# Patient Record
Sex: MALE | Race: WHITE | URBAN - METROPOLITAN AREA
[De-identification: names, ages, dates, MRNs, and addresses within clinical notes are randomized per-mention and may not be internally consistent; named-entity substitution may affect disease eponyms.]

---

## 2021-12-14 ENCOUNTER — OFFICE VISIT (OUTPATIENT)
Dept: FAMILY MEDICINE CLINIC | Facility: CLINIC | Age: 31
End: 2021-12-14

## 2021-12-14 VITALS
DIASTOLIC BLOOD PRESSURE: 90 MMHG | TEMPERATURE: 98 F | RESPIRATION RATE: 18 BRPM | WEIGHT: 315 LBS | HEIGHT: 66 IN | HEART RATE: 88 BPM | SYSTOLIC BLOOD PRESSURE: 138 MMHG | BODY MASS INDEX: 50.62 KG/M2 | OXYGEN SATURATION: 97 %

## 2021-12-14 DIAGNOSIS — Z00.00 WELLNESS EXAMINATION: Primary | ICD-10-CM

## 2021-12-14 DIAGNOSIS — I10 PRIMARY HYPERTENSION: ICD-10-CM

## 2021-12-14 DIAGNOSIS — E78.2 MIXED HYPERLIPIDEMIA: ICD-10-CM

## 2021-12-14 DIAGNOSIS — E11.65 TYPE 2 DIABETES MELLITUS WITH HYPERGLYCEMIA, WITHOUT LONG-TERM CURRENT USE OF INSULIN (HCC): ICD-10-CM

## 2021-12-14 PROBLEM — E66.01 MORBID OBESITY DUE TO EXCESS CALORIES (HCC): Status: ACTIVE | Noted: 2021-12-14

## 2021-12-14 PROCEDURE — 99385 PREV VISIT NEW AGE 18-39: CPT | Performed by: FAMILY MEDICINE

## 2021-12-14 PROCEDURE — 3080F DIAST BP >= 90 MM HG: CPT | Performed by: FAMILY MEDICINE

## 2021-12-14 PROCEDURE — 3075F SYST BP GE 130 - 139MM HG: CPT | Performed by: FAMILY MEDICINE

## 2021-12-14 PROCEDURE — 3008F BODY MASS INDEX DOCD: CPT | Performed by: FAMILY MEDICINE

## 2021-12-14 RX ORDER — LOSARTAN POTASSIUM 25 MG/1
25 TABLET ORAL DAILY
COMMUNITY
Start: 2021-10-14 | End: 2021-12-14

## 2021-12-14 RX ORDER — KETOCONAZOLE 20 MG/ML
SHAMPOO TOPICAL
COMMUNITY
Start: 2021-08-05

## 2021-12-14 RX ORDER — TAZAROTENE 1 MG/G
CREAM TOPICAL
COMMUNITY

## 2021-12-14 RX ORDER — CLINDAMYCIN PHOSPHATE AND BENZOYL PEROXIDE 10; 50 MG/G; MG/G
GEL TOPICAL
COMMUNITY

## 2021-12-14 RX ORDER — AMLODIPINE BESYLATE 10 MG/1
10 TABLET ORAL DAILY
COMMUNITY
Start: 2021-10-14 | End: 2021-12-14

## 2021-12-14 RX ORDER — LOSARTAN POTASSIUM 25 MG/1
25 TABLET ORAL DAILY
Qty: 90 TABLET | Refills: 1 | Status: SHIPPED | OUTPATIENT
Start: 2021-12-14

## 2021-12-14 RX ORDER — AMLODIPINE BESYLATE 10 MG/1
10 TABLET ORAL DAILY
Qty: 90 TABLET | Refills: 1 | Status: SHIPPED | OUTPATIENT
Start: 2021-12-14

## 2021-12-14 NOTE — PATIENT INSTRUCTIONS
Thank you for choosing Yogesh Rocha MD at [place] [address]  To Do: Jana Sloan  1. Please see info below  [facility] is located in [address]. Monday, Tuesday & Friday – 8 a.m. to 4 p.m. Wednesday, Thursday – 7 a.m. to 3 p.m.   The lab potential risks and we strive to make you healthier and to improve your quality of life.     Referrals, and Radiology Information:    If your insurance requires a referral to a specialist, please allow 5 business days to process your referral request.    If include fruits and fruit juices, dairy products, honey, and molasses. Added sugars are found in most desserts, processed foods, candy, regular soda, and fruit drinks. These are very helpful to treat low blood sugar (hypoglycemia).  They give you sugar quick cup of casserole  · 6 chicken nuggets  · 2-inch-square brownie or cake without frosting  · 2 small cookies  · 1/2 cup of ice cream or sherbet  Carb counting is easier when food labels are available.  Look at the label to see how many grams of total carbohyd figure out how many grams of carbs you should have. Justen last reviewed this educational content on 11/1/2019  © 7268-0102 [employer]. All rights reserved. This information is not intended as a substitute for professional medical care. at increased risk for infection – talk with your healthcare provider Check with your healthcare provider   Vision All men in this age group Every 5 to 10 years if no risk factors for eye disease   Vaccines Who needs it How often   Chickenpox (varicella) Al exercise Overweight or obese people When diagnosed, and then at routine exams   Use of tobacco and the health effects it can cause All men in this age group Every visit   Sexually transmitted infection prevention Men who are sexually active At routine exam

## 2021-12-14 NOTE — H&P
Wellness Exam    REASON FOR VISIT:    Gloria Chiu is a 33year old male who presents for an [address]     Current Complaints: Mr. Alma Rosa Betancur is here for his wellness exam  Flu Shot: see immunization record  Health Maintenance Topics with due Score: 0     PHQ-4: Over the LAST 2 WEEKS       Depression Screening (PHQ-2/PHQ-9): Over the LAST 2 WEEKS   Little interest or pleasure in doing things (over the last two weeks)?: Not at all    Feeling down, depressed, or hopeless (over the last two weeks) Tab Take 1 tablet (10 mg total) by mouth daily. 90 tablet 1   • losartan 25 MG Oral Tab Take 1 tablet (25 mg total) by mouth daily.  90 tablet 1      MEDICAL INFORMATION:   Past Medical History:   Diagnosis Date   • Diabetes ([facility]    • Essential hypertensio and normal bilaterally  Nose: Nose normal.   Eyes: EOM are normal. Pupils are equal, round, and reactive to light. No scleral icterus. Neck: Normal range of motion. No thyromegaly present.    Cardiovascular: Normal rate, regular rhythm and normal heart so mg total) by mouth daily. -     losartan 25 MG Oral Tab; Take 1 tablet (25 mg total) by mouth daily.       Mixed hyperlipidemia  -We will check lipid panel      Orders Placed This Encounter      CBC W Differential W Platelet [E]      Comp Metabolic Panel (

## 2021-12-28 ENCOUNTER — E-VISIT (OUTPATIENT)
Dept: TELEHEALTH | Age: 31
End: 2021-12-28

## 2021-12-28 DIAGNOSIS — Z02.9 ENCOUNTERS FOR ADMINISTRATIVE PURPOSE: Primary | ICD-10-CM

## 2021-12-28 DIAGNOSIS — Z87.898 HX OF ABDOMINAL PAIN: ICD-10-CM

## 2021-12-28 DIAGNOSIS — Z02.9 ENCOUNTER FOR ADMINISTRATIVE EXAMINATIONS, UNSPECIFIED: Primary | ICD-10-CM

## 2021-12-29 ENCOUNTER — TELEMEDICINE (OUTPATIENT)
Dept: FAMILY MEDICINE CLINIC | Facility: CLINIC | Age: 31
End: 2021-12-29

## 2021-12-29 DIAGNOSIS — Z20.822 ENCOUNTER FOR LABORATORY TESTING FOR COVID-19 VIRUS: Primary | ICD-10-CM

## 2021-12-29 PROCEDURE — 99213 OFFICE O/P EST LOW 20 MIN: CPT | Performed by: FAMILY MEDICINE

## 2021-12-29 NOTE — PROGRESS NOTES
Subjective    This visit is conducted using Telemedicine with live, interactive video and audio.     Chief Complaint:  Patient presents with:  Covid-19 Test        The patient confirmed knowledge of the limitations of the use of telemedicine were verbally tobacco: Never Used    Vaping Use      Vaping Use: Never used    Alcohol use: Yes      Comment: 2 a week    Drug use: Never           COVID-19 QUESTIONS - quick screening.    Contact with COVID-19 positive person: no  Recent Travel no  Pt is a [employer]

## 2021-12-29 NOTE — PROGRESS NOTES
No charge, pt following up from E-visit yesterday. Pt referred for in person evaluation. Gave patient locations to follow up for an inperson evaluation.

## 2021-12-30 ENCOUNTER — NURSE ONLY (OUTPATIENT)
Dept: LAB | Age: 31
End: 2021-12-30
Attending: FAMILY MEDICINE
Payer: COMMERCIAL

## 2021-12-30 DIAGNOSIS — Z20.822 ENCOUNTER FOR LABORATORY TESTING FOR COVID-19 VIRUS: ICD-10-CM

## 2022-01-02 LAB — SARS-COV-2 RNA RESP QL NAA+PROBE: NOT DETECTED

## 2022-01-04 ENCOUNTER — VIRTUAL PHONE E/M (OUTPATIENT)
Dept: FAMILY MEDICINE CLINIC | Facility: CLINIC | Age: 32
End: 2022-01-04
Payer: COMMERCIAL

## 2022-01-04 DIAGNOSIS — J02.9 SORE THROAT: Primary | ICD-10-CM

## 2022-01-04 PROCEDURE — 99214 OFFICE O/P EST MOD 30 MIN: CPT | Performed by: FAMILY MEDICINE

## 2022-01-04 NOTE — PROGRESS NOTES
Subjective:   Patient ID: Ld Broussard is a 33year old male. HPI  Mr. Mio Carrasquillo is a pleasant 27-year-old gentleman with history of hypertension who is presenting for phone visit today for sore throat. He woke up with this today.   Does not have fever, correct errors during dictation discrepancies may still exist          Orders Placed This Encounter      COVID-19 Testing by PCR ([facility]) [E]      Meds This Visit:  Requested Prescriptions      No prescriptions requested or ordered in this encounter

## 2022-01-04 NOTE — PROGRESS NOTES
Virtual Telephone Check-In    Rodolfo Wiggins verbally consents to a Virtual/Telephone Check-In visit on 01/04/22. Patient has been referred to the [facility] website at [email or web address] to review the yearly Consent to Treat document.     Patient Kathleen

## 2022-01-04 NOTE — PATIENT INSTRUCTIONS
Coronavirus Disease 2019 (COVID-19)     [place] [address] is committed to the safety and well-being of our patients, members, employees, and communities.  As concerns arise about the new strain of coronavirus that causes COVID-19, [place] [address] exposure  • After day 7 from date of last exposure with a negative test result (test must occur on day 5 or later)  After stopping quarantine, you should  • Watch for symptoms until 14 days after exposure.   • If you have symptoms, immediately self-isolate Care     If you are awaiting test results or are confirmed positive for COVID -19, and your symptoms worsen at home with symptoms such as: extreme weakness, difficult breathing, or unrelenting fevers greater than 100.4 degrees Fahrenheit, you should contac Follow-up  If you are diagnosed with COVID, refrain from exercise until approved by your primary care provider. Please call your primary care provider within 2 days of your discharge to arrange for a telehealth follow-up.  CDC does not recommend repeat test Control & Prevention (CDC)  10 things you can do to manage your health at home, Cj.nl. pdf  [email or web address]cy Retrieved March 17, 2021, from [email or web address]/covid-19-information/covid-19-long-haulers. html  Long-term effects of covid-19. (n.d.).  Retrieved May 11, 2021, from [email or web address]

## 2022-01-05 ENCOUNTER — NURSE ONLY (OUTPATIENT)
Dept: LAB | Facility: HOSPITAL | Age: 32
End: 2022-01-05
Attending: FAMILY MEDICINE
Payer: COMMERCIAL

## 2022-01-05 DIAGNOSIS — J02.9 SORE THROAT: ICD-10-CM

## 2022-01-08 LAB — SARS-COV-2 RNA RESP QL NAA+PROBE: DETECTED

## 2022-03-14 ENCOUNTER — LAB ENCOUNTER (OUTPATIENT)
Dept: LAB | Age: 32
End: 2022-03-14
Attending: FAMILY MEDICINE
Payer: COMMERCIAL

## 2022-03-14 DIAGNOSIS — E11.65 TYPE 2 DIABETES MELLITUS WITH HYPERGLYCEMIA, WITHOUT LONG-TERM CURRENT USE OF INSULIN (HCC): ICD-10-CM

## 2022-03-14 LAB
ALBUMIN SERPL-MCNC: 3.9 G/DL (ref 3.4–5)
ALBUMIN/GLOB SERPL: 1.1 {RATIO} (ref 1–2)
ALP LIVER SERPL-CCNC: 68 U/L
ALT SERPL-CCNC: 36 U/L
ANION GAP SERPL CALC-SCNC: 1 MMOL/L (ref 0–18)
AST SERPL-CCNC: 18 U/L (ref 15–37)
BASOPHILS # BLD AUTO: 0.06 X10(3) UL (ref 0–0.2)
BASOPHILS NFR BLD AUTO: 0.8 %
BILIRUB SERPL-MCNC: 0.3 MG/DL (ref 0.1–2)
BUN BLD-MCNC: 9 MG/DL (ref 7–18)
CALCIUM BLD-MCNC: 9.1 MG/DL (ref 8.5–10.1)
CHLORIDE SERPL-SCNC: 104 MMOL/L (ref 98–112)
CHOLEST SERPL-MCNC: 165 MG/DL (ref ?–200)
CO2 SERPL-SCNC: 32 MMOL/L (ref 21–32)
CREAT BLD-MCNC: 0.65 MG/DL
CREAT UR-SCNC: 128 MG/DL
EOSINOPHIL # BLD AUTO: 0.13 X10(3) UL (ref 0–0.7)
EOSINOPHIL NFR BLD AUTO: 1.6 %
ERYTHROCYTE [DISTWIDTH] IN BLOOD BY AUTOMATED COUNT: 13.7 %
EST. AVERAGE GLUCOSE BLD GHB EST-MCNC: 140 MG/DL (ref 68–126)
FASTING PATIENT LIPID ANSWER: YES
FASTING STATUS PATIENT QL REPORTED: YES
GLOBULIN PLAS-MCNC: 3.5 G/DL (ref 2.8–4.4)
GLUCOSE BLD-MCNC: 133 MG/DL (ref 70–99)
HBA1C MFR BLD: 6.5 % (ref ?–5.7)
HCT VFR BLD AUTO: 44.9 %
HDLC SERPL-MCNC: 38 MG/DL (ref 40–59)
HGB BLD-MCNC: 15 G/DL
IMM GRANULOCYTES # BLD AUTO: 0.05 X10(3) UL (ref 0–1)
IMM GRANULOCYTES NFR BLD: 0.6 %
LDLC SERPL CALC-MCNC: 93 MG/DL (ref ?–100)
LYMPHOCYTES # BLD AUTO: 2.98 X10(3) UL (ref 1–4)
LYMPHOCYTES NFR BLD AUTO: 37.6 %
MCH RBC QN AUTO: 29.4 PG (ref 26–34)
MCHC RBC AUTO-ENTMCNC: 33.4 G/DL (ref 31–37)
MCV RBC AUTO: 88 FL
MICROALBUMIN UR-MCNC: 18.8 MG/DL
MICROALBUMIN/CREAT 24H UR-RTO: 146.9 UG/MG (ref ?–30)
MONOCYTES # BLD AUTO: 0.59 X10(3) UL (ref 0.1–1)
MONOCYTES NFR BLD AUTO: 7.4 %
NEUTROPHILS # BLD AUTO: 4.12 X10 (3) UL (ref 1.5–7.7)
NEUTROPHILS # BLD AUTO: 4.12 X10(3) UL (ref 1.5–7.7)
NEUTROPHILS NFR BLD AUTO: 52 %
NONHDLC SERPL-MCNC: 127 MG/DL (ref ?–130)
OSMOLALITY SERPL CALC.SUM OF ELEC: 285 MOSM/KG (ref 275–295)
PLATELET # BLD AUTO: 324 10(3)UL (ref 150–450)
POTASSIUM SERPL-SCNC: 4.1 MMOL/L (ref 3.5–5.1)
PROT SERPL-MCNC: 7.4 G/DL (ref 6.4–8.2)
RBC # BLD AUTO: 5.1 X10(6)UL
SODIUM SERPL-SCNC: 137 MMOL/L (ref 136–145)
T4 FREE SERPL-MCNC: 1 NG/DL (ref 0.8–1.7)
TRIGL SERPL-MCNC: 196 MG/DL (ref 30–149)
TSI SER-ACNC: 2.26 MIU/ML (ref 0.36–3.74)
VLDLC SERPL CALC-MCNC: 32 MG/DL (ref 0–30)
WBC # BLD AUTO: 7.9 X10(3) UL (ref 4–11)

## 2022-03-14 PROCEDURE — 84443 ASSAY THYROID STIM HORMONE: CPT

## 2022-03-14 PROCEDURE — 3044F HG A1C LEVEL LT 7.0%: CPT | Performed by: FAMILY MEDICINE

## 2022-03-14 PROCEDURE — 83036 HEMOGLOBIN GLYCOSYLATED A1C: CPT

## 2022-03-14 PROCEDURE — 82043 UR ALBUMIN QUANTITATIVE: CPT

## 2022-03-14 PROCEDURE — 36415 COLL VENOUS BLD VENIPUNCTURE: CPT

## 2022-03-14 PROCEDURE — 82570 ASSAY OF URINE CREATININE: CPT

## 2022-03-14 PROCEDURE — 80053 COMPREHEN METABOLIC PANEL: CPT

## 2022-03-14 PROCEDURE — 80061 LIPID PANEL: CPT

## 2022-03-14 PROCEDURE — 85025 COMPLETE CBC W/AUTO DIFF WBC: CPT

## 2022-03-14 PROCEDURE — 3060F POS MICROALBUMINURIA REV: CPT | Performed by: FAMILY MEDICINE

## 2022-03-14 PROCEDURE — 84439 ASSAY OF FREE THYROXINE: CPT

## 2022-03-14 PROCEDURE — 3061F NEG MICROALBUMINURIA REV: CPT | Performed by: FAMILY MEDICINE

## 2022-03-17 ENCOUNTER — OFFICE VISIT (OUTPATIENT)
Dept: FAMILY MEDICINE CLINIC | Facility: CLINIC | Age: 32
End: 2022-03-17
Payer: COMMERCIAL

## 2022-03-17 VITALS
HEART RATE: 92 BPM | DIASTOLIC BLOOD PRESSURE: 86 MMHG | WEIGHT: 315 LBS | RESPIRATION RATE: 16 BRPM | SYSTOLIC BLOOD PRESSURE: 130 MMHG | TEMPERATURE: 98 F | HEIGHT: 66 IN | BODY MASS INDEX: 50.62 KG/M2 | OXYGEN SATURATION: 98 %

## 2022-03-17 DIAGNOSIS — I10 PRIMARY HYPERTENSION: Primary | ICD-10-CM

## 2022-03-17 DIAGNOSIS — E11.65 TYPE 2 DIABETES MELLITUS WITH HYPERGLYCEMIA, WITHOUT LONG-TERM CURRENT USE OF INSULIN (HCC): ICD-10-CM

## 2022-03-17 PROCEDURE — 3008F BODY MASS INDEX DOCD: CPT | Performed by: FAMILY MEDICINE

## 2022-03-17 PROCEDURE — 3079F DIAST BP 80-89 MM HG: CPT | Performed by: FAMILY MEDICINE

## 2022-03-17 PROCEDURE — 3075F SYST BP GE 130 - 139MM HG: CPT | Performed by: FAMILY MEDICINE

## 2022-03-17 PROCEDURE — 99214 OFFICE O/P EST MOD 30 MIN: CPT | Performed by: FAMILY MEDICINE

## 2022-03-17 RX ORDER — LOSARTAN POTASSIUM 25 MG/1
25 TABLET ORAL DAILY
Qty: 90 TABLET | Refills: 1 | Status: SHIPPED | OUTPATIENT
Start: 2022-03-17

## 2022-03-17 RX ORDER — AMLODIPINE BESYLATE 10 MG/1
10 TABLET ORAL DAILY
Qty: 90 TABLET | Refills: 1 | Status: SHIPPED | OUTPATIENT
Start: 2022-03-17

## 2022-03-28 ENCOUNTER — DIABETIC EDUCATION (OUTPATIENT)
Dept: ENDOCRINOLOGY CLINIC | Facility: CLINIC | Age: 32
End: 2022-03-28
Payer: COMMERCIAL

## 2022-03-28 DIAGNOSIS — E11.9 TYPE 2 DIABETES MELLITUS WITHOUT COMPLICATION, WITHOUT LONG-TERM CURRENT USE OF INSULIN (HCC): Primary | ICD-10-CM

## 2022-03-28 PROCEDURE — 97802 MEDICAL NUTRITION INDIV IN: CPT | Performed by: DIETITIAN, REGISTERED

## 2022-03-28 NOTE — PROGRESS NOTES
Rodolfo Wiggins   10/23/1990 was seen for Medical Nutrition Therapy with Diabetes:    3/28/2022  Referring Provider: Dr. Scruggs Marking  Start time: 3:00 End time: 4:00    HgbA1C (%)   Date Value   03/14/2022 6.5 (H)     Newly diagnosed with T2DB. During the day he is good but challenge is late at noc. Tries to keep to ~400-500 calories/meal for B&L.   B: 1/2 c oatmeal, almond protein powder, flaxseed, blueberries. L: wrap    Tracks calories on Lose It valentino. Doesn't track his HS snacks, gave alternatives. D: cooks  HS: snacks to stay awake while he works late into the evening. Bags of healthy chips. Beverages: water. Cut down on diet coke. Likes coffee - 5 calories/TBS creamer    Regular physical activity: try to get up earlier and work out. New puppy in January, he likes to take 30 minutes walks with the puppy. Reviewed HgbA1C and target A1c levels. Reviewed Type 2 diabetes as a diagnosis. Discussed insulin resistance and tools to treat: diet, regular physical activity, diabetes medications, and stress management. Also discussed natural progression of Type 2 diabetes and ways to slow progression: regular physical activity, good blood glucose control and avoid weight gain/possibly reduce weight. Taught label reading: focus on counting grams of carb rather than looking at sugar. Reviewed current diabetes medications: n/a    Discussed Rule of 15 for treating hypoglycemia. Discussed macronutrient balance: reduce starch, include lean protein and non-starchy vegetables, also fruit, yogurt and milk using food models. Gave examples of heart healthy, balanced meals with 30-45 grams of carbohydrate/meal.     Reviewed principles for heart healthy diet: reduced saturated fat, reduced sodium and high fiber. Exercise: Discussed benefits of regular physical activity and goal to complete 30 minutes daily.     Patient set diabetes self-management goals: 1) change or eliminate HS snacking/go to bed earlier, 2) consistent physical activity, 3) track calories consistently to promote weight reduction. Patient is willing to make lifestyle changes to improve blood glucose control. Written materials provided for all topics covered. Patient verbalized understanding and has no further questions at this time. Thank you for the referral.  Patient to contact diabetes center for questions/concerns.   Willian Mane MS, RD, CDCES, LDN

## 2022-09-13 RX ORDER — AMLODIPINE BESYLATE 10 MG/1
TABLET ORAL
Qty: 90 TABLET | Refills: 1 | Status: SHIPPED | OUTPATIENT
Start: 2022-09-13

## 2022-09-13 RX ORDER — LOSARTAN POTASSIUM 25 MG/1
TABLET ORAL
Qty: 90 TABLET | Refills: 1 | Status: SHIPPED | OUTPATIENT
Start: 2022-09-13

## 2022-11-08 ENCOUNTER — HOSPITAL ENCOUNTER (OUTPATIENT)
Age: 32
Discharge: HOME | End: 2022-11-08
Payer: COMMERCIAL

## 2022-11-08 PROCEDURE — 90471 IMMUNIZATION ADMIN: CPT

## 2022-11-15 ENCOUNTER — LAB ENCOUNTER (OUTPATIENT)
Dept: LAB | Age: 32
End: 2022-11-15
Attending: FAMILY MEDICINE
Payer: COMMERCIAL

## 2022-11-15 DIAGNOSIS — E11.65 TYPE 2 DIABETES MELLITUS WITH HYPERGLYCEMIA, WITHOUT LONG-TERM CURRENT USE OF INSULIN (HCC): ICD-10-CM

## 2022-11-15 LAB
EST. AVERAGE GLUCOSE BLD GHB EST-MCNC: 148 MG/DL (ref 68–126)
HBA1C MFR BLD: 6.8 % (ref ?–5.7)

## 2022-11-15 PROCEDURE — 3044F HG A1C LEVEL LT 7.0%: CPT | Performed by: FAMILY MEDICINE

## 2022-11-15 PROCEDURE — 83036 HEMOGLOBIN GLYCOSYLATED A1C: CPT

## 2022-11-15 PROCEDURE — 36415 COLL VENOUS BLD VENIPUNCTURE: CPT

## 2022-11-17 ENCOUNTER — OFFICE VISIT (OUTPATIENT)
Dept: FAMILY MEDICINE CLINIC | Facility: CLINIC | Age: 32
End: 2022-11-17
Payer: COMMERCIAL

## 2022-11-17 VITALS
HEIGHT: 66 IN | BODY MASS INDEX: 50.62 KG/M2 | OXYGEN SATURATION: 99 % | WEIGHT: 315 LBS | SYSTOLIC BLOOD PRESSURE: 150 MMHG | RESPIRATION RATE: 16 BRPM | TEMPERATURE: 98 F | HEART RATE: 88 BPM | DIASTOLIC BLOOD PRESSURE: 94 MMHG

## 2022-11-17 DIAGNOSIS — I10 PRIMARY HYPERTENSION: ICD-10-CM

## 2022-11-17 DIAGNOSIS — R41.840 LACK OF CONCENTRATION: ICD-10-CM

## 2022-11-17 DIAGNOSIS — R06.81 APNEA: ICD-10-CM

## 2022-11-17 DIAGNOSIS — E11.65 TYPE 2 DIABETES MELLITUS WITH HYPERGLYCEMIA, WITHOUT LONG-TERM CURRENT USE OF INSULIN (HCC): Primary | ICD-10-CM

## 2022-11-17 PROCEDURE — 99215 OFFICE O/P EST HI 40 MIN: CPT | Performed by: FAMILY MEDICINE

## 2022-11-17 PROCEDURE — 3008F BODY MASS INDEX DOCD: CPT | Performed by: FAMILY MEDICINE

## 2022-11-17 PROCEDURE — 3077F SYST BP >= 140 MM HG: CPT | Performed by: FAMILY MEDICINE

## 2022-11-17 PROCEDURE — 3080F DIAST BP >= 90 MM HG: CPT | Performed by: FAMILY MEDICINE

## 2022-11-17 NOTE — PATIENT INSTRUCTIONS
Thank you for choosing Olman Quiñones MD at [place] [address]  To Do: Tanna Carrasquillo  1. Please see below  [facility] is located in [address]. Monday, Tuesday & Friday - 8 a.m. to 4 p.m. Wednesday, Thursday - 7 a.m. to 3 p.m. The lab is closed daily from 12 p.m.-12:30 p.m. Saturday lab hours by appointment. Call [phone] to schedule the appointment. Please signup for Bolt, which is electronic access to your record if you have not done so. All your results will post on there. [email or web address]   You can NOW use Bolt to book your appointments with us, or consider using open access scheduling which is through the edward website [email or web address]. [employer]ne[employer] and type in Olman Quiñones MD and follow the links for \"Schedule Online Now\"    To schedule Imaging or tests at [facility] Scheduling [phone], Go to [facility] A ER Building (For example: CT scans, X rays, Ultrasound, MRI)  Cardiac Testing in ER building Building A second floor Cardiac Testing [phone] (For example: Holter Monitor, Cardiac Stress tests,Event Monitor, or 2D Echocardiograms)  [facility] call [phone] usually in Bldg A  Walk in Clinic in [place] at [facility]. [address] Mon-Fri at 8am-7:30 p.m., and Sat/Sun 9:00a. m.-4:30 p.m. Also at [address] [facility]  Call [phone] for info     Please call our office about any questions regarding your treatment/medicines/tests as a result of today's visit. For your safety, read the entire package insert of all medicines prescribed to you and be aware of all of the risks of treatment even beyond those discussed today. All therapies have potential risk of harm or side effects or medication interactions.   It is your duty and for your safety to discuss with the pharmacist and our office with questions, and to notify us and stop treatment if problems arise, but know that our intention is that the benefits outweigh those potential risks and we strive to make you healthier and to improve your quality of life. Referrals, and Radiology Information:    If your insurance requires a referral to a specialist, please allow 5 business days to process your referral request.    If Chio Carlin MD orders a CT or MRI, it may take up to 10 business days to receive approval from your insurance company. Once our office has called informing you that the insurance company approved your testing, please call Central Scheduling at [phone]  Please allow our office 5 business days to contact you regarding any testing results. Refill policies:   Allow 3 business days for refills; controlled substances may take longer and must be picked up from the office in person. Narcotic medications can only be filled in 30 day increments and must be refilled at an office visit only. If your prescription is due for a refill, you may be due for a follow-up appointment. We cannot refill your maintenance medications at a preventative wellness visit. To best provide you care, patients receiving maintenance medications need to be seen at least twice a year.

## 2022-12-04 ENCOUNTER — PATIENT MESSAGE (OUTPATIENT)
Dept: FAMILY MEDICINE CLINIC | Facility: CLINIC | Age: 32
End: 2022-12-04

## 2022-12-05 ENCOUNTER — PATIENT MESSAGE (OUTPATIENT)
Dept: FAMILY MEDICINE CLINIC | Facility: CLINIC | Age: 32
End: 2022-12-05

## 2022-12-05 DIAGNOSIS — Z00.8 PSYCHOLOGICAL ASSESSMENT: ICD-10-CM

## 2022-12-05 DIAGNOSIS — R41.840 LACK OF CONCENTRATION: Primary | ICD-10-CM

## 2022-12-06 NOTE — TELEPHONE ENCOUNTER
From: Stiven Edmondson  Sent: 12/5/2022 2:28 PM CST  To: [facility] Clinical Staff  Subject: ADHD     I called them and they indicated they do work with my insurance. If you could send a referral to them that would be great. Their info is below:    [facility]  Fax: [phone]  Email: [email or web address]

## 2022-12-07 ENCOUNTER — PATIENT MESSAGE (OUTPATIENT)
Dept: FAMILY MEDICINE CLINIC | Facility: CLINIC | Age: 32
End: 2022-12-07

## 2022-12-08 NOTE — TELEPHONE ENCOUNTER
Updated referral placed and faxed to Dr. Sherwin Alberto with [facility], [phone], confirmation received.

## 2022-12-09 ENCOUNTER — IMMUNIZATION (OUTPATIENT)
Dept: LAB | Age: 32
End: 2022-12-09
Attending: EMERGENCY MEDICINE
Payer: COMMERCIAL

## 2022-12-09 DIAGNOSIS — Z23 NEED FOR VACCINATION: Primary | ICD-10-CM

## 2022-12-09 PROCEDURE — 0134A SARSCOV2 VAC BVL 50MCG/0.5ML: CPT

## 2022-12-29 ENCOUNTER — OFFICE VISIT (OUTPATIENT)
Dept: SLEEP CENTER | Age: 32
End: 2022-12-29
Attending: FAMILY MEDICINE
Payer: COMMERCIAL

## 2022-12-29 DIAGNOSIS — R06.81 APNEA: ICD-10-CM

## 2022-12-29 PROCEDURE — 95810 POLYSOM 6/> YRS 4/> PARAM: CPT

## 2023-01-05 ENCOUNTER — SLEEP STUDY (OUTPATIENT)
Facility: CLINIC | Age: 33
End: 2023-01-05
Payer: COMMERCIAL

## 2023-01-05 DIAGNOSIS — G47.33 OBSTRUCTIVE SLEEP APNEA: Primary | ICD-10-CM

## 2023-01-05 PROCEDURE — 95810 POLYSOM 6/> YRS 4/> PARAM: CPT | Performed by: OTHER

## 2023-01-09 ENCOUNTER — TELEPHONE (OUTPATIENT)
Facility: CLINIC | Age: 33
End: 2023-01-09

## 2023-01-09 DIAGNOSIS — G47.33 OSA (OBSTRUCTIVE SLEEP APNEA): Primary | ICD-10-CM

## 2023-01-09 NOTE — TELEPHONE ENCOUNTER
Notified pt of sleep study results and  Recommendation to proceed with cpap titration. Pt instructed to follow up with  After testing is done. Pt verbalized understanding of instructions and agreed with the plan.

## 2023-02-07 ENCOUNTER — LAB ENCOUNTER (OUTPATIENT)
Dept: LAB | Age: 33
End: 2023-02-07
Attending: Other
Payer: COMMERCIAL

## 2023-02-07 DIAGNOSIS — Z01.818 PREOPERATIVE EXAMINATION, UNSPECIFIED: ICD-10-CM

## 2023-02-07 DIAGNOSIS — Z11.59 SCREENING FOR VIRAL DISEASE: ICD-10-CM

## 2023-02-08 LAB — SARS-COV-2 RNA RESP QL NAA+PROBE: NOT DETECTED

## 2023-02-10 ENCOUNTER — OFFICE VISIT (OUTPATIENT)
Dept: SLEEP CENTER | Age: 33
End: 2023-02-10
Attending: Other
Payer: COMMERCIAL

## 2023-02-10 DIAGNOSIS — G47.33 OSA (OBSTRUCTIVE SLEEP APNEA): ICD-10-CM

## 2023-02-10 PROCEDURE — 95811 POLYSOM 6/>YRS CPAP 4/> PARM: CPT

## 2023-02-16 ENCOUNTER — SLEEP STUDY (OUTPATIENT)
Facility: CLINIC | Age: 33
End: 2023-02-16
Payer: COMMERCIAL

## 2023-02-16 DIAGNOSIS — G47.33 OBSTRUCTIVE SLEEP APNEA: Primary | ICD-10-CM

## 2023-02-16 PROCEDURE — 95811 POLYSOM 6/>YRS CPAP 4/> PARM: CPT | Performed by: OTHER

## 2023-02-18 ENCOUNTER — LAB ENCOUNTER (OUTPATIENT)
Dept: LAB | Age: 33
End: 2023-02-18
Attending: FAMILY MEDICINE
Payer: COMMERCIAL

## 2023-02-18 DIAGNOSIS — E11.65 TYPE 2 DIABETES MELLITUS WITH HYPERGLYCEMIA, WITHOUT LONG-TERM CURRENT USE OF INSULIN (HCC): ICD-10-CM

## 2023-02-18 LAB
ALBUMIN SERPL-MCNC: 4.1 G/DL (ref 3.4–5)
ALBUMIN/GLOB SERPL: 1.2 {RATIO} (ref 1–2)
ALP LIVER SERPL-CCNC: 58 U/L
ALT SERPL-CCNC: 36 U/L
ANION GAP SERPL CALC-SCNC: 2 MMOL/L (ref 0–18)
AST SERPL-CCNC: 21 U/L (ref 15–37)
BASOPHILS # BLD AUTO: 0.03 X10(3) UL (ref 0–0.2)
BASOPHILS NFR BLD AUTO: 0.4 %
BILIRUB SERPL-MCNC: 0.5 MG/DL (ref 0.1–2)
BUN BLD-MCNC: 12 MG/DL (ref 7–18)
CALCIUM BLD-MCNC: 8.9 MG/DL (ref 8.5–10.1)
CHLORIDE SERPL-SCNC: 106 MMOL/L (ref 98–112)
CHOLEST SERPL-MCNC: 175 MG/DL (ref ?–200)
CO2 SERPL-SCNC: 31 MMOL/L (ref 21–32)
CREAT BLD-MCNC: 0.69 MG/DL
EOSINOPHIL # BLD AUTO: 0.07 X10(3) UL (ref 0–0.7)
EOSINOPHIL NFR BLD AUTO: 1 %
ERYTHROCYTE [DISTWIDTH] IN BLOOD BY AUTOMATED COUNT: 13.2 %
EST. AVERAGE GLUCOSE BLD GHB EST-MCNC: 143 MG/DL (ref 68–126)
FASTING PATIENT LIPID ANSWER: YES
FASTING STATUS PATIENT QL REPORTED: YES
GFR SERPLBLD BASED ON 1.73 SQ M-ARVRAT: 125 ML/MIN/1.73M2 (ref 60–?)
GLOBULIN PLAS-MCNC: 3.5 G/DL (ref 2.8–4.4)
GLUCOSE BLD-MCNC: 107 MG/DL (ref 70–99)
HBA1C MFR BLD: 6.6 % (ref ?–5.7)
HCT VFR BLD AUTO: 46.2 %
HDLC SERPL-MCNC: 37 MG/DL (ref 40–59)
HGB BLD-MCNC: 15.4 G/DL
IMM GRANULOCYTES # BLD AUTO: 0.03 X10(3) UL (ref 0–1)
IMM GRANULOCYTES NFR BLD: 0.4 %
LDLC SERPL CALC-MCNC: 113 MG/DL (ref ?–100)
LYMPHOCYTES # BLD AUTO: 2.32 X10(3) UL (ref 1–4)
LYMPHOCYTES NFR BLD AUTO: 32.9 %
MCH RBC QN AUTO: 29.1 PG (ref 26–34)
MCHC RBC AUTO-ENTMCNC: 33.3 G/DL (ref 31–37)
MCV RBC AUTO: 87.3 FL
MONOCYTES # BLD AUTO: 0.49 X10(3) UL (ref 0.1–1)
MONOCYTES NFR BLD AUTO: 6.9 %
NEUTROPHILS # BLD AUTO: 4.12 X10 (3) UL (ref 1.5–7.7)
NEUTROPHILS # BLD AUTO: 4.12 X10(3) UL (ref 1.5–7.7)
NEUTROPHILS NFR BLD AUTO: 58.4 %
NONHDLC SERPL-MCNC: 138 MG/DL (ref ?–130)
OSMOLALITY SERPL CALC.SUM OF ELEC: 288 MOSM/KG (ref 275–295)
PLATELET # BLD AUTO: 332 10(3)UL (ref 150–450)
POTASSIUM SERPL-SCNC: 3.9 MMOL/L (ref 3.5–5.1)
PROT SERPL-MCNC: 7.6 G/DL (ref 6.4–8.2)
RBC # BLD AUTO: 5.29 X10(6)UL
SODIUM SERPL-SCNC: 139 MMOL/L (ref 136–145)
T4 FREE SERPL-MCNC: 0.9 NG/DL (ref 0.8–1.7)
TRIGL SERPL-MCNC: 140 MG/DL (ref 30–149)
TSI SER-ACNC: 1.42 MIU/ML (ref 0.36–3.74)
VLDLC SERPL CALC-MCNC: 24 MG/DL (ref 0–30)
WBC # BLD AUTO: 7.1 X10(3) UL (ref 4–11)

## 2023-02-18 PROCEDURE — 85025 COMPLETE CBC W/AUTO DIFF WBC: CPT

## 2023-02-18 PROCEDURE — 83036 HEMOGLOBIN GLYCOSYLATED A1C: CPT

## 2023-02-18 PROCEDURE — 80053 COMPREHEN METABOLIC PANEL: CPT

## 2023-02-18 PROCEDURE — 36415 COLL VENOUS BLD VENIPUNCTURE: CPT

## 2023-02-18 PROCEDURE — 84443 ASSAY THYROID STIM HORMONE: CPT

## 2023-02-18 PROCEDURE — 3044F HG A1C LEVEL LT 7.0%: CPT | Performed by: FAMILY MEDICINE

## 2023-02-18 PROCEDURE — 80061 LIPID PANEL: CPT

## 2023-02-18 PROCEDURE — 84439 ASSAY OF FREE THYROXINE: CPT

## 2023-02-21 ENCOUNTER — TELEPHONE (OUTPATIENT)
Facility: CLINIC | Age: 33
End: 2023-02-21

## 2023-02-21 DIAGNOSIS — G47.33 OSA (OBSTRUCTIVE SLEEP APNEA): Primary | ICD-10-CM

## 2023-02-21 NOTE — TELEPHONE ENCOUNTER
Reviewed sleep study results and Dr's recommendations. Pt notified cpap machine ordered to [address] [place] [address]. DME will verify insurance and once approved will contact pt to arrange delivery and instructions. Pt instructed to follow up with Dr. Serafin Perez once pt starts PAP therapy per insurance compliance requirement. Pt verbalized understanding of instructions and agrees with the plan.

## 2023-02-22 ENCOUNTER — PATIENT MESSAGE (OUTPATIENT)
Dept: FAMILY MEDICINE CLINIC | Facility: CLINIC | Age: 33
End: 2023-02-22

## 2023-02-22 ENCOUNTER — OFFICE VISIT (OUTPATIENT)
Dept: FAMILY MEDICINE CLINIC | Facility: CLINIC | Age: 33
End: 2023-02-22
Payer: COMMERCIAL

## 2023-02-22 VITALS
WEIGHT: 315 LBS | HEART RATE: 88 BPM | RESPIRATION RATE: 16 BRPM | HEIGHT: 66 IN | DIASTOLIC BLOOD PRESSURE: 90 MMHG | SYSTOLIC BLOOD PRESSURE: 144 MMHG | TEMPERATURE: 98 F | OXYGEN SATURATION: 98 % | BODY MASS INDEX: 50.62 KG/M2

## 2023-02-22 DIAGNOSIS — E11.65 TYPE 2 DIABETES MELLITUS WITH HYPERGLYCEMIA, WITHOUT LONG-TERM CURRENT USE OF INSULIN (HCC): Primary | ICD-10-CM

## 2023-02-22 DIAGNOSIS — I10 PRIMARY HYPERTENSION: ICD-10-CM

## 2023-02-22 PROBLEM — G47.33 OSA (OBSTRUCTIVE SLEEP APNEA): Status: ACTIVE | Noted: 2023-02-22

## 2023-02-22 PROCEDURE — 3080F DIAST BP >= 90 MM HG: CPT | Performed by: FAMILY MEDICINE

## 2023-02-22 PROCEDURE — 3077F SYST BP >= 140 MM HG: CPT | Performed by: FAMILY MEDICINE

## 2023-02-22 PROCEDURE — 99214 OFFICE O/P EST MOD 30 MIN: CPT | Performed by: FAMILY MEDICINE

## 2023-02-22 PROCEDURE — 3008F BODY MASS INDEX DOCD: CPT | Performed by: FAMILY MEDICINE

## 2023-02-22 RX ORDER — LOSARTAN POTASSIUM 100 MG/1
100 TABLET ORAL DAILY
Qty: 90 TABLET | Refills: 1 | Status: SHIPPED | OUTPATIENT
Start: 2023-02-22

## 2023-02-22 NOTE — PATIENT INSTRUCTIONS
Thank you for choosing Panda Becerra MD at [place] [address]  To Do: Jania Malave  1. Please see info    2. High Blood pressure  What Is a Normal Blood Pressure? The Joint National Committee on Prevention, Detection, Evaluation, and Treatment of High Blood Pressure has classified blood pressure measurements into several categories:  Normal blood pressure is systolic pressure less than 492 and diastolic pressure less than 80 mmHg. \"Prehypertension\" is systolic pressure of 260-213 or diastolic pressure of 11-90 mmHg. Stage 1 Hypertension is blood pressure greater than systolic pressure of 011-917 or diastolic pressure of 81-68 mmHg or greater. Stage 2 Hypertension is systolic pressure of 874 or greater or diastolic pressure of 550 or greater. What Health Problems Are Associated With High Blood Pressure? Several potentially serious health conditions are linked to high blood pressure, including: Atherosclerosis: a disease of the arteries caused by a buildup of plaque, or fatty material, on the inside walls of the blood vessels; hypertension contributes to this buildup by putting added stress and force on the artery walls. Heart Disease: Heart failure (the heart is not strong enough to pump blood adequately), ischemic heart disease (the heart tissue doesn't get enough blood), and hypertensive hypertrophic cardiomyopathy (thickened, abnormally functioning heart muscle) are all associated with high blood pressure. Kidney Disease: Hypertension can damage the blood vessels and filters in the kidneys, so that the kidneys cannot excrete waste properly. Stroke: Hypertension can lead to stroke, either by contributing to the process of atherosclerosis (which can lead to blockages and/or clots), or by weakening the blood vessel wall and causing it to rupture. Eye Disease: Hypertension can damage the very small blood vessels in the retina.   Bleeding from the aorta, the large blood vessel that supplies blood to the abdomen, pelvis, and legs   Heart failure   Poor blood supply to the legs  Erectile Dysfunction  Problems with your vision    Overview  \"Blood pressure\" is the force of blood pushing against the walls of the arteries as the heart pumps blood. If this pressure rises and stays high over time, it can damage the body in many ways. About 1 in 3 adults in the [place] has HBP. The condition itself usually has no signs or symptoms. You can have it for years without knowing it. During this time, though, HBP can damage your heart, blood vessels, kidneys, and other parts of your body. Knowing your blood pressure numbers is important, even when you're feeling fine. If your blood pressure is normal, you can work with your health care team to keep it that way. If your blood pressure is too high, treatment may help prevent damage to your body's organs. By [facility] staff   DASH stands for Dietary Approaches to Stop Hypertension. The DASH diet is a lifelong approach to healthy eating that's designed to help treat or prevent high blood pressure (hypertension). The DASH diet encourages you to reduce the sodium in your diet and eat a variety of foods rich in nutrients that help lower blood pressure, such as potassium, calcium and magnesium. By following the DASH diet, you may be able to reduce your blood pressure by a few points in just two weeks. Over time, your blood pressure could drop by eight to 14 points, which can make a significant difference in your health risks. DASH DIET  The low-salt Dietary Approaches to Stop Hypertension (DASH) diet is proven to help lower blood pressure. Its effects on blood pressure are sometimes seen within a few weeks.   This diet is not only rich in important nutrients and fiber, but it also includes foods that contain far more potassium (4,700 milligrams (mg)/day), calcium (1,250 mg/day), and magnesium (500 mg/day) and much less sodium (salt) than the typical American diet.  Limit sodium to no more than 2,300 mg a day (eating only 1,500 mg a day is an even better goal). Reduce saturated fat to no more than 6% of daily calories and total fat to 27% of daily calories. Low-fat dairy products appear to be especially beneficial for lowering systolic blood pressure. When choosing fats, select monounsaturated oils, such as olive or canola oils. Choose whole grains over white flour or pasta products. Choose fresh fruits and vegetables every day. Many of these foods are rich in potassium, fiber, or both. Eat nuts, seeds, or legumes (dried beans or peas) daily. Choose modest amounts of protein (no more than 18% of total daily calories). Fish, skinless poultry, and soy products are the best protein sources. Other daily nutrient goals in the DASH diet include limiting carbohydrates to 55% of daily calories and dietary cholesterol to 150 mg. Try to get at least 30 grams (g) of daily fiber. Grains (6 to 8 servings a day)  Grains include bread, cereal, rice and pasta. Examples of one serving of grains include 1 slice whole-wheat bread, 1 ounce (oz.) dry cereal, or 1/2 cup cooked cereal, rice or pasta. Focus on whole grains because they have more fiber and nutrients than do refined grains. For instance, use brown rice instead of white rice, whole-wheat pasta instead of regular pasta and whole-grain bread instead of white bread. Look for products labeled \"100 percent whole grain\" or \"100 percent whole wheat. \"   Grains are naturally low in fat, so avoid spreading on butter or adding cream and cheese sauces. Vegetables (4 to 5 servings a day)  Tomatoes, carrots, broccoli, sweet potatoes, greens and other vegetables are full of fiber, vitamins, and such minerals as potassium and magnesium. Examples of one serving include 1 cup raw leafy green vegetables or 1/2 cup cut-up raw or cooked vegetables.    Don't think of vegetables only as side dishes -- a hearty blend of vegetables served over brown rice or whole-wheat noodles can serve as the main dish for a meal.   Fresh or frozen vegetables are both good choices. When buying frozen and canned vegetables, choose those labeled as low sodium or without added salt. To increase the number of servings you fit in daily, be creative. In a stir-persaud, for instance, cut the amount of meat in half and double up on the vegetables. Fruits (4 to 5 servings a day)  Many fruits need little preparation to become a healthy part of a meal or snack. Like vegetables, they're packed with fiber, potassium and magnesium and are typically low in fat -- exceptions include avocados and coconuts. Examples of one serving include 1 medium fruit or 1/2 cup fresh, frozen or canned fruit. Have a piece of fruit with meals and one as a snack, then round out your day with a dessert of fresh fruits topped with a splash of low-fat yogurt. Leave on edible peels whenever possible. The peels of apples, pears and most fruits with pits add interesting texture to recipes and contain healthy nutrients and fiber. Remember that citrus fruits and juice, such as grapefruit, can interact with certain medications, so check with your doctor or pharmacist to see if they're OK for you. Dairy (2 to 3 servings a day)  Milk, yogurt, cheese and other dairy products are major sources of calcium, vitamin D and protein. But the key is to make sure that you choose dairy products that are low-fat or fat-free because otherwise they can be a major source of fat. Examples of one serving include 1 cup skim or 1% milk, 1 cup yogurt or 1 1/2 oz. cheese. Low-fat or fat-free frozen yogurt can help you boost the amount of dairy products you eat while offering a sweet treat.  Add fruit for a healthy twist.   If you have trouble digesting dairy products, choose lactose-free products or consider taking an over-the-counter product that contains the enzyme lactase, which can reduce or prevent the symptoms of lactose intolerance. Go easy on regular and even fat-free cheeses because they are typically high in sodium. Lean meat, poultry and fish (6 or fewer servings a day)  Meat can be a rich source of protein, B vitamins, iron and zinc. But because even lean varieties contain fat and cholesterol, don't make them a mainstay of your diet -- cut back typical meat portions by one-third or one-half and pile on the vegetables instead. Examples of one serving include 1 oz. cooked skinless poultry, seafood or lean meat, 1 egg, or 1 oz. water-packed, no-salt-added canned tuna. Trim away skin and fat from meat and then broil, grill, roast or poach instead of frying. Eat heart-healthy fish, such as salmon, herring and tuna. These types of fish are high in omega-3 fatty acids, which can help lower your total cholesterol. Nuts, seeds and legumes (4 to 5 servings a week)   Almonds, sunflower seeds, kidney beans, peas, lentils and other foods in this family are good sources of magnesium, potassium and protein. They're also full of fiber and phytochemicals, which are plant compounds that may protect against some cancers and cardiovascular disease. Serving sizes are small and are intended to be consumed weekly because these foods are high in calories. Examples of one serving include 1/3 cup (1 1/2 oz.) nuts, 2 tablespoons seeds or 1/2 cup cooked beans or peas. Nuts sometimes get a bad rap because of their fat content, but they contain healthy types of fat -- monounsaturated fat and omega-3 fatty acids. They're high in calories, however, so eat them in moderation. Try adding them to stir-fries, salads or cereals. Soybean-based products, such as tofu and tempeh, can be a good alternative to meat because they contain all of the amino acids your body needs to make a complete protein, just like meat.  They also contain isoflavones, a type of natural plant compound (phytochemical) that has been shown to have some health benefits. Fats and oils (2 to 3 servings a day)  Fat helps your body absorb essential vitamins and helps your body's immune system. But too much fat increases your risk of heart disease, diabetes and obesity. The DASH diet strives for a healthy balance by providing 30 percent or less of daily calories from fat, with a focus on the healthier unsaturated fats. Examples of one serving include 1 teaspoon soft margarine, 1 tablespoon low-fat mayonnaise or 2 tablespoons light salad dressing. Saturated fat and trans fat are the main dietary culprits in raising your blood cholesterol and increasing your risk of coronary artery disease. DASH helps keep your daily saturated fat to less than 10 percent of your total calories by limiting use of meat, butter, cheese, whole milk, cream and eggs in your diet, along with foods made from lard, solid shortenings, and palm and coconut oils. Avoid trans fat, commonly found in such processed foods as crackers, baked goods and fried items. Read food labels on margarine and salad dressing so that you can choose those that are lowest in saturated fat and free of trans fat. Sweets (5 or fewer a week)  You don't have to banish sweets entirely while following the DASH diet -- just go easy on them. Examples of one serving include 1 tablespoon sugar, jelly or jam, 1/2 cup sorbet or 1 cup (8 oz.) lemonade. When you eat sweets, choose those that are fat-free or low-fat, such as sorbets, fruit ices, jelly beans, hard candy, antonio crackers or low-fat cookies. Artificial sweeteners such as aspartame (NutraSweet, Equal) and sucralose (Splenda) may help satisfy your sweet tooth while sparing the sugar. But remember that you still must use them sensibly. It's OK to swap a diet cola for a regular cola, but not in place of a more nutritious beverage such as low-fat milk or even plain water. Cut back on added sugar, which has no nutritional value but can pack on calories.    DASH diet: Alcohol and caffeine  Drinking too much alcohol can increase blood pressure. The DASH diet recommends that men limit alcohol to two or fewer drinks a day and women one or less. The DASH diet doesn't address caffeine consumption. The influence of caffeine on blood pressure remains unclear. But caffeine can cause your blood pressure to rise at least temporarily. If you already have high blood pressure or if you think caffeine is affecting your blood pressure, talk to your doctor about your caffeine consumption. The DASH diet is not designed to promote weight loss, but it can be used as part of an overall weight-loss strategy. The DASH diet is based on a diet of about 2,000 calories a day. If you're trying to lose weight, though, you may want to eat around 1,600 a day. You may need to adjust your serving goals based on your health or individual circumstances -- something your health care team can help you decide. Tips to cut back on sodium  The foods at the core of the DASH diet are naturally low in sodium. So just by following the DASH diet, you're likely to reduce your sodium intake. You also can cut back on sodium in your diet by:   Using sodium-free spices or flavorings with your food instead of salt   Not adding salt when cooking rice, pasta or hot cereal   Rinsing canned foods to remove some of the sodium   Buying foods labeled \"no salt added,\" \"sodium-free,\" \"low sodium\" or \"very low sodium\"   One teaspoon of table salt has about 2,300 mg of sodium, and 2/3 teaspoon of table salt has about 1,500 mg of sodium. When you read food labels, you may be surprised at just how much sodium some processed foods contain. Even low-fat soups, canned vegetables, ready-to-eat cereals and sliced turkey from the local deli -- all foods you may have considered healthy -- often have lots of sodium. You may not notice a difference in taste when you choose low-sodium food and beverages.  If things seem too bland, gradually introduce low-sodium foods and cut back on table salt until you reach your sodium goal. That'll give your palate time to adjust. It can take several weeks for your taste buds to get used to less salty foods. [facility] is located in [address]. Monday, Tuesday & Friday - 8 a.m. to 4 p.m. Wednesday, Thursday - 7 a.m. to 3 p.m. The lab is closed daily from 12 p.m.-12:30 p.m. Saturday lab hours by appointment. Call [phone] to schedule the appointment. Please signup for DNART LIMITADA, which is electronic access to your record if you have not done so. All your results will post on there. [email or web address]   You can NOW use DNART LIMITADA to book your appointments with us, or consider using open access scheduling which is through the edward website [email or web address] and type in Deb Laurent MD and follow the links for \"Schedule Online Now\"    To schedule Imaging or tests at [facility] Scheduling [phone], Go to [facility] A ER Building (For example: CT scans, X rays, Ultrasound, MRI)  Cardiac Testing in ER building Building A second floor Cardiac Testing [phone] (For example: Holter Monitor, Cardiac Stress tests,Event Monitor, or 2D Echocardiograms)  [facility] call [phone] usually in [facility] A  Walk in Clinic in [place] at [facility]. [address] Mon-Fri at 8am-7:30 p.m., and Sat/Sun 9:00a. m.-4:30 p.m. Also at [address]  Call [phone] for info     Please call our office about any questions regarding your treatment/medicines/tests as a result of today's visit. For your safety, read the entire package insert of all medicines prescribed to you and be aware of all of the risks of treatment even beyond those discussed today. All therapies have potential risk of harm or side effects or medication interactions.   It is your duty and for your safety to discuss with the pharmacist and our office with questions, and to notify us and stop treatment if problems arise, but know that our intention is that the benefits outweigh those potential risks and we strive to make you healthier and to improve your quality of life. Referrals, and Radiology Information:    If your insurance requires a referral to a specialist, please allow 5 business days to process your referral request.    If Chio Carlin MD orders a CT or MRI, it may take up to 10 business days to receive approval from your insurance company. Once our office has called informing you that the insurance company approved your testing, please call Central Scheduling at [phone]  Please allow our office 5 business days to contact you regarding any testing results. Refill policies:   Allow 3 business days for refills; controlled substances may take longer and must be picked up from the office in person. Narcotic medications can only be filled in 30 day increments and must be refilled at an office visit only. If your prescription is due for a refill, you may be due for a follow-up appointment. We cannot refill your maintenance medications at a preventative wellness visit. To best provide you care, patients receiving maintenance medications need to be seen at least twice a year.

## 2023-02-22 NOTE — TELEPHONE ENCOUNTER
From: Mary Limon  To: Fela Hancock  Sent: 2/22/2023 11:05 AM CST  Subject: Referral    Alfonso Hercules,  Dr Maria Luisa Schwartz is not within your insurance network. Referral has not been approved. You can call your insurance company to see who is in network, than let us know.   Thanks, Valeriy Rois

## 2023-02-24 ENCOUNTER — TELEPHONE (OUTPATIENT)
Dept: FAMILY MEDICINE CLINIC | Facility: CLINIC | Age: 33
End: 2023-02-24

## 2023-02-24 DIAGNOSIS — G47.33 OSA (OBSTRUCTIVE SLEEP APNEA): Primary | ICD-10-CM

## 2023-02-24 NOTE — TELEPHONE ENCOUNTER
Pt is scheduled for appointment with Dr. Bradley Huerta on 3/29/23, referral needed of OV. Referral placed.

## 2023-03-06 ENCOUNTER — TELEPHONE (OUTPATIENT)
Facility: CLINIC | Age: 33
End: 2023-03-06

## 2023-03-12 RX ORDER — LOSARTAN POTASSIUM 25 MG/1
TABLET ORAL
Qty: 90 TABLET | Refills: 1 | Status: SHIPPED | OUTPATIENT
Start: 2023-03-12

## 2023-03-21 ENCOUNTER — PATIENT MESSAGE (OUTPATIENT)
Dept: FAMILY MEDICINE CLINIC | Facility: CLINIC | Age: 33
End: 2023-03-21

## 2023-03-21 NOTE — TELEPHONE ENCOUNTER
From: Herrera De La Rosa  To: Stacey Parker MD  Sent: 3/21/2023 9:41 AM CDT  Subject: Supplements    Based off of my blood work results, are there any recommendations on supplements I might need to take? I have recently considered taking a multivitamin. My wife and I are trying to conceive, and I was wondering if you had any recommendations on a supplement that might be able to help with fertility/overall health. Thank you.

## 2023-03-28 NOTE — PROGRESS NOTES
This is a 27year old male who presents with the following symptoms, risk factors, behaviors or other items associated with sleep problems. Sleep Apnea:   snoring; non-refreshing sleep; overweight; high blood pressure; diabetes; current CPAP use  Insomnia:  not getting enough sleep  Restless Leg:  no symptoms or restless legs  Parasomnias:   no symptoms of parasomnias  Daytime Problems:  sleepiness; fatigue; dificulty concentrating; memory problems; dozing off unintentionally; sleepy while driving; dozing off at work; previous sleep study    The patient's [place] Sleepiness score is 13/24.

## 2023-03-29 ENCOUNTER — OFFICE VISIT (OUTPATIENT)
Facility: CLINIC | Age: 33
End: 2023-03-29
Payer: COMMERCIAL

## 2023-03-29 VITALS
BODY MASS INDEX: 50.14 KG/M2 | SYSTOLIC BLOOD PRESSURE: 148 MMHG | DIASTOLIC BLOOD PRESSURE: 94 MMHG | HEIGHT: 66 IN | OXYGEN SATURATION: 96 % | WEIGHT: 312 LBS | HEART RATE: 86 BPM | RESPIRATION RATE: 18 BRPM

## 2023-03-29 DIAGNOSIS — I10 PRIMARY HYPERTENSION: ICD-10-CM

## 2023-03-29 DIAGNOSIS — G47.33 OSA (OBSTRUCTIVE SLEEP APNEA): Primary | ICD-10-CM

## 2023-03-29 DIAGNOSIS — I10 HYPERTENSION, UNSPECIFIED TYPE: ICD-10-CM

## 2023-03-29 DIAGNOSIS — E66.01 MORBID OBESITY DUE TO EXCESS CALORIES (HCC): ICD-10-CM

## 2023-03-29 DIAGNOSIS — E11.65 TYPE 2 DIABETES MELLITUS WITH HYPERGLYCEMIA, WITHOUT LONG-TERM CURRENT USE OF INSULIN (HCC): ICD-10-CM

## 2023-03-29 PROCEDURE — 99204 OFFICE O/P NEW MOD 45 MIN: CPT | Performed by: OTHER

## 2023-03-29 PROCEDURE — 3080F DIAST BP >= 90 MM HG: CPT | Performed by: OTHER

## 2023-03-29 PROCEDURE — 3008F BODY MASS INDEX DOCD: CPT | Performed by: OTHER

## 2023-03-29 PROCEDURE — 3077F SYST BP >= 140 MM HG: CPT | Performed by: OTHER

## 2023-03-29 NOTE — PATIENT INSTRUCTIONS
ENT Keenan Mocrissy [phone]  Please be advised:   Do not drive while sleepy   Take CPAP/BiPAP machine to any procedure that requires sedation     When should I clean my machine & supplies? Clean mask cushions or nasal pillow, headgear, tubing, and humidifier chamber with mild soap ([place]) and water   If water chamber has hard water buildup (white crust), soak in warm water & vinegar mix 50/50. Rinse and hang dry     DAILY   Wipe mask cushions or nasal pillow   Empty & rinse water chamber- refill with distilled water     WEEKLY   Clean mask cushions or nasal pillow, headgear, tubing, and humidifier chamber with mild soap ([place]) and water   If water chamber has hard water buildup (white crust), soak in warm water & vinegar mix 50/50. Rinse and hang dry     When should supplies be replaced? Contact your home care company for replacement supplies, or if your machine is malfunctioning   *Below is a general guideline of what we recommend. Replacement of supplies differs depending on your insurance company*   MONTHLY: Replace filter and mask cushion   6 MONTHS: Replace headgear and tubing     Travel Tips   Keep CPAP/BiPAP in original bag when traveling, and place luggage tag on bag   Most airlines consider CPAP/BiPAP to be a medical device, therefore it is a free carry-on item   If unable to get distilled water, bottled water is safe while traveling.  DO NOT use tap water   When traveling outside the [place] only a power adapter is necessary (CPAP can operate without a converter), bring an extension cord   Consider purchasing or renting a travel CPAP (not covered by insurance)     Dry Mouth/Nose   Turn up the humidity on your machine (select \"Options\" from the home screen)   Place a cool mist humidifier at your bedside   Over-the-counter remedies: Biotene, XyliMelts, NasoGel     Air Leak   Try adjusting your mask/headgear while laying in sleeping position vs. sitting up   Wash and dry your face prior to putting your mask on   If applicable: shave facial hair at night (or before wearing CPAP)   Purchase \"RemZzzs\" (through home care co., [employer], or remzzzs. com)   100% cotton knit barrier that goes between your mask cushion and your skin   Replace your mask cushion (at least once per month) and/or headgear (every 3-6 months)     Nasal Congestion   CPAP therapy can cause nasal passages to dry out, & mucous membranes try to protect the nasal passages by producing excess mucous, so congestion results. Over-the counter remedies: Flonase, Nasacort, Sinus Rinses (Neti-Pot), DO NOT USE Afrin   Try a mask that goes over the nose and mouth     Skin Irritation   Clean supplies regularly (Citrus II Mask Wipes, Control III disinfectant solution)   Try over the counter creams such as hydrocortisone 1% (apply in the morning after showering)   Your headgear may be too tight, replace supplies so you don't need to adjust so tightly   Try RemZzzs (100% cotton knit barrier that goes between your mask cushion and your skin)     Gas/Bloating   Try a different sleeping position to keep air out of the stomach. Lay on the left side or rotate to the right side. Incline with pillows or lay flat. Over-the-counter remedies: Simethicone        Discussed the addition of a mask liner. Options reviewed including REMzzz, Snugz, or permanent mask liners (padacheek. com). This should help address leak and skin irritation.

## 2023-04-25 ENCOUNTER — TELEMEDICINE (OUTPATIENT)
Dept: FAMILY MEDICINE CLINIC | Facility: CLINIC | Age: 33
End: 2023-04-25
Payer: COMMERCIAL

## 2023-04-25 DIAGNOSIS — R41.840 ATTENTION AND CONCENTRATION DEFICIT: Primary | ICD-10-CM

## 2023-04-25 PROCEDURE — 99213 OFFICE O/P EST LOW 20 MIN: CPT | Performed by: FAMILY MEDICINE

## 2023-05-04 ENCOUNTER — PATIENT MESSAGE (OUTPATIENT)
Dept: FAMILY MEDICINE CLINIC | Facility: CLINIC | Age: 33
End: 2023-05-04

## 2023-05-04 RX ORDER — HYDROCHLOROTHIAZIDE 25 MG/1
25 TABLET ORAL DAILY
Qty: 30 TABLET | Refills: 0 | Status: SHIPPED | OUTPATIENT
Start: 2023-05-04

## 2023-05-04 NOTE — TELEPHONE ENCOUNTER
Please add hydrochlorothiazide 25 mg daily together with amlodipine 10 mg daily and losartan 100 mg daily

## 2023-05-04 NOTE — TELEPHONE ENCOUNTER
From: June Sims  To: Gilda Baltazar MD  Sent: 5/4/2023 10:46 AM CDT  Subject: Blood Pressure Readings    Hi Dr. Janee Carcamo been taking my blood pressure medication, eating healthy, working out consistently and losing weight. My blood pressure readings are still in the 160/95 range. What recommendation do you have? Do I need to adjust my medication?

## 2023-05-17 ENCOUNTER — TELEPHONE (OUTPATIENT)
Facility: CLINIC | Age: 33
End: 2023-05-17

## 2023-05-17 DIAGNOSIS — I10 PRIMARY HYPERTENSION: ICD-10-CM

## 2023-05-17 DIAGNOSIS — E66.01 MORBID OBESITY DUE TO EXCESS CALORIES (HCC): ICD-10-CM

## 2023-05-17 DIAGNOSIS — G47.33 OSA (OBSTRUCTIVE SLEEP APNEA): Primary | ICD-10-CM

## 2023-05-17 NOTE — TELEPHONE ENCOUNTER
Per pt request, full face mask order through [address] [place] [address], order sent. Detailed message sent via [employer] Pt to contact [address] [place] [address] for delivery status.

## 2023-05-17 NOTE — TELEPHONE ENCOUNTER
Pt stated Dr. Miriam Lozada was going to order a Full Face Mask FFM and he wondered if he could get that prior to his 31-90 appointment with [facility] ?

## 2023-05-17 NOTE — TELEPHONE ENCOUNTER
Hello     This request has been closed. Please see referral # [id number]  Code  is full face mask.  Patient should reach out to [employer] to obtain supplies

## 2023-05-20 ENCOUNTER — LABORATORY ENCOUNTER (OUTPATIENT)
Dept: LAB | Age: 33
End: 2023-05-20
Attending: FAMILY MEDICINE
Payer: COMMERCIAL

## 2023-05-20 DIAGNOSIS — E11.65 TYPE 2 DIABETES MELLITUS WITH HYPERGLYCEMIA, WITHOUT LONG-TERM CURRENT USE OF INSULIN (HCC): ICD-10-CM

## 2023-05-20 LAB
ALBUMIN SERPL-MCNC: 3.8 G/DL (ref 3.4–5)
ALBUMIN/GLOB SERPL: 1 {RATIO} (ref 1–2)
ALP LIVER SERPL-CCNC: 51 U/L
ALT SERPL-CCNC: 28 U/L
ANION GAP SERPL CALC-SCNC: 5 MMOL/L (ref 0–18)
AST SERPL-CCNC: 18 U/L (ref 15–37)
BILIRUB SERPL-MCNC: 0.7 MG/DL (ref 0.1–2)
BUN BLD-MCNC: 15 MG/DL (ref 7–18)
CALCIUM BLD-MCNC: 8.9 MG/DL (ref 8.5–10.1)
CHLORIDE SERPL-SCNC: 104 MMOL/L (ref 98–112)
CHOLEST SERPL-MCNC: 127 MG/DL (ref ?–200)
CO2 SERPL-SCNC: 28 MMOL/L (ref 21–32)
CREAT BLD-MCNC: 0.74 MG/DL
EST. AVERAGE GLUCOSE BLD GHB EST-MCNC: 108 MG/DL (ref 68–126)
FASTING PATIENT LIPID ANSWER: YES
FASTING STATUS PATIENT QL REPORTED: YES
GFR SERPLBLD BASED ON 1.73 SQ M-ARVRAT: 122 ML/MIN/1.73M2 (ref 60–?)
GLOBULIN PLAS-MCNC: 4 G/DL (ref 2.8–4.4)
GLUCOSE BLD-MCNC: 92 MG/DL (ref 70–99)
HBA1C MFR BLD: 5.4 % (ref ?–5.7)
HDLC SERPL-MCNC: 36 MG/DL (ref 40–59)
LDLC SERPL CALC-MCNC: 70 MG/DL (ref ?–100)
NONHDLC SERPL-MCNC: 91 MG/DL (ref ?–130)
OSMOLALITY SERPL CALC.SUM OF ELEC: 284 MOSM/KG (ref 275–295)
POTASSIUM SERPL-SCNC: 3.5 MMOL/L (ref 3.5–5.1)
PROT SERPL-MCNC: 7.8 G/DL (ref 6.4–8.2)
SODIUM SERPL-SCNC: 137 MMOL/L (ref 136–145)
TRIGL SERPL-MCNC: 114 MG/DL (ref 30–149)
VLDLC SERPL CALC-MCNC: 17 MG/DL (ref 0–30)

## 2023-05-20 PROCEDURE — 80053 COMPREHEN METABOLIC PANEL: CPT

## 2023-05-20 PROCEDURE — 36415 COLL VENOUS BLD VENIPUNCTURE: CPT

## 2023-05-20 PROCEDURE — 83036 HEMOGLOBIN GLYCOSYLATED A1C: CPT

## 2023-05-20 PROCEDURE — 80061 LIPID PANEL: CPT

## 2023-05-23 ENCOUNTER — OFFICE VISIT (OUTPATIENT)
Dept: FAMILY MEDICINE CLINIC | Facility: CLINIC | Age: 33
End: 2023-05-23
Payer: COMMERCIAL

## 2023-05-23 VITALS
TEMPERATURE: 98 F | SYSTOLIC BLOOD PRESSURE: 138 MMHG | BODY MASS INDEX: 46.28 KG/M2 | RESPIRATION RATE: 16 BRPM | WEIGHT: 288 LBS | DIASTOLIC BLOOD PRESSURE: 80 MMHG | HEART RATE: 60 BPM | OXYGEN SATURATION: 99 % | HEIGHT: 66 IN

## 2023-05-23 DIAGNOSIS — E11.65 TYPE 2 DIABETES MELLITUS WITH HYPERGLYCEMIA, WITHOUT LONG-TERM CURRENT USE OF INSULIN (HCC): ICD-10-CM

## 2023-05-23 DIAGNOSIS — Z00.00 WELLNESS EXAMINATION: Primary | ICD-10-CM

## 2023-05-23 DIAGNOSIS — I10 PRIMARY HYPERTENSION: ICD-10-CM

## 2023-05-23 PROCEDURE — 3008F BODY MASS INDEX DOCD: CPT | Performed by: FAMILY MEDICINE

## 2023-05-23 PROCEDURE — 3079F DIAST BP 80-89 MM HG: CPT | Performed by: FAMILY MEDICINE

## 2023-05-23 PROCEDURE — 3075F SYST BP GE 130 - 139MM HG: CPT | Performed by: FAMILY MEDICINE

## 2023-05-23 PROCEDURE — 99214 OFFICE O/P EST MOD 30 MIN: CPT | Performed by: FAMILY MEDICINE

## 2023-05-23 NOTE — PATIENT INSTRUCTIONS
Thank you for choosing Mateo Bateman MD at [place] [address]  To Do: Cassie Vazquez  1. Please see age appropriate health prevention below    [facility] is located in [address]. Monday, Tuesday & Friday - 8 a.m. to 4 p.m. Wednesday, Thursday - 7 a.m. to 3 p.m. The lab is closed daily from 12 p.m.-12:30 p.m. Saturday lab hours by appointment. Call [phone] to schedule the appointment. Please signup for Nuggeta, which is electronic access to your record if you have not done so. All your results will post on there. [email or web address]   You can NOW use Nuggeta to book your appointments with us, or consider using open access scheduling which is through the edward website [email or web address] and type in Mateo Bateman MD and follow the links for \"Schedule Online Now\"    To schedule Imaging or tests at [facility] Scheduling [phone], Go to [facility] A ER Building (For example: CT scans, X rays, Ultrasound, MRI)  Cardiac Testing in ER building Building A second floor Cardiac Testing [phone] (For example: Holter Monitor, Cardiac Stress tests,Event Monitor, or 2D Echocardiograms)  [facility] call [phone] usually in dg A  Walk in Clinic in [place] at [facility]. [address] Mon-Fri at 8am-7:30 p.m., and Sat/Sun 9:00a. m.-4:30 p.m. Also at [address]  Call [phone] for info     Please call our office about any questions regarding your treatment/medicines/tests as a result of today's visit. For your safety, read the entire package insert of all medicines prescribed to you and be aware of all of the risks of treatment even beyond those discussed today. All therapies have potential risk of harm or side effects or medication interactions.   It is your duty and for your safety to discuss with the pharmacist and our office with questions, and to notify us and stop treatment if problems arise, but know that our intention is that the benefits outweigh those potential risks and we strive to make you healthier and to improve your quality of life. Referrals, and Radiology Information:    If your insurance requires a referral to a specialist, please allow 5 business days to process your referral request.    If Tacos Vaughan MD orders a CT or MRI, it may take up to 10 business days to receive approval from your insurance company. Once our office has called informing you that the insurance company approved your testing, please call Central Scheduling at [phone]  Please allow our office 5 business days to contact you regarding any testing results. Refill policies:   Allow 3 business days for refills; controlled substances may take longer and must be picked up from the office in person. Narcotic medications can only be filled in 30 day increments and must be refilled at an office visit only. If your prescription is due for a refill, you may be due for a follow-up appointment. We cannot refill your maintenance medications at a preventative wellness visit. To best provide you care, patients receiving maintenance medications need to be seen at least twice a year.

## 2023-05-30 RX ORDER — HYDROCHLOROTHIAZIDE 25 MG/1
TABLET ORAL
Qty: 30 TABLET | Refills: 0 | Status: SHIPPED | OUTPATIENT
Start: 2023-05-30

## 2023-06-05 NOTE — PROGRESS NOTES
2023 - 2023  Patient ID: [id number]  : 10/23/1990  Age: 27 years    Compliance Report  Usage 2023 - 2023  Usage days 49/50 days (98%)  >= 4 hours 37 days (74%)  < 4 hours 12 days (24%)  Usage hours 232 hours 59 minutes  Average usage (total days) 4 hours 40 minutes  Average usage (days used) 4 hours 45 minutes  Median usage (days used) 4 hours 55 minutes  Total used hours (value since last reset - 2023) 233 hours  AirSense 11 AutoSet  Serial number [id number]  Mode AutoSet  Min Pressure 10 cmH2O  Max Pressure 15 cmH2O  EPR Ramp Only  EPR level 3  Response Standard  Therapy  Pressure - cmH2O Median: 10.6 95th percentile: 12.4 Maximum: 13.3  Leaks - L/min Median: 6.6 95th percentile: 28.6 Maximum: 38.1  Events per hour AI: 0.3 HI: 0.1 AHI: 0.4  Apnea Index Central: 0.1 Obstructive: 0.2 Unknown: 0.0  RERA Index 0.1  Cheyne-Mcnamara respiration (average duration per night) 0 minutes (0%

## 2023-06-06 ENCOUNTER — TELEMEDICINE (OUTPATIENT)
Facility: CLINIC | Age: 33
End: 2023-06-06
Payer: COMMERCIAL

## 2023-06-06 DIAGNOSIS — G47.33 OSA (OBSTRUCTIVE SLEEP APNEA): Primary | ICD-10-CM

## 2023-06-06 PROCEDURE — 99214 OFFICE O/P EST MOD 30 MIN: CPT | Performed by: NURSE PRACTITIONER

## 2023-06-06 NOTE — PATIENT INSTRUCTIONS
Recommend to ordering a chin strap from [address] [place] [address], pay closer attention to puttng PAP tx back on after waking up, may consider trimming beard/mustache, try face mask when arrives and will recheck download in 1 month  Followup in 1 month by telehealth

## 2023-06-28 RX ORDER — HYDROCHLOROTHIAZIDE 25 MG/1
25 TABLET ORAL DAILY
Qty: 30 TABLET | Refills: 0 | Status: SHIPPED | OUTPATIENT
Start: 2023-06-28 | End: 2023-06-28

## 2023-06-28 RX ORDER — HYDROCHLOROTHIAZIDE 25 MG/1
25 TABLET ORAL DAILY
Qty: 90 TABLET | Refills: 0 | Status: SHIPPED | OUTPATIENT
Start: 2023-06-28

## 2023-06-28 RX ORDER — AMLODIPINE BESYLATE 10 MG/1
10 TABLET ORAL DAILY
Qty: 90 TABLET | Refills: 1 | Status: SHIPPED | OUTPATIENT
Start: 2023-06-28

## 2023-07-07 ENCOUNTER — TELEPHONE (OUTPATIENT)
Facility: CLINIC | Age: 33
End: 2023-07-07

## 2023-07-07 ENCOUNTER — TELEMEDICINE (OUTPATIENT)
Facility: CLINIC | Age: 33
End: 2023-07-07
Payer: COMMERCIAL

## 2023-07-07 DIAGNOSIS — G47.33 OSA (OBSTRUCTIVE SLEEP APNEA): Primary | ICD-10-CM

## 2023-07-07 DIAGNOSIS — I10 PRIMARY HYPERTENSION: ICD-10-CM

## 2023-07-07 DIAGNOSIS — E66.01 MORBID OBESITY DUE TO EXCESS CALORIES (HCC): ICD-10-CM

## 2023-07-07 NOTE — PROGRESS NOTES
[place]  2023 - 2023  Patient ID: [id number]  : 10/23/1990  Age: 27 years   E [facility]  Compliance Report  Usage 2023 - 2023  Usage days 27/30 days (90%)  >= 4 hours 17 days (57%)  < 4 hours 10 days (33%)  Usage hours 123 hours 35 minutes  Average usage (total days) 4 hours 7 minutes  Average usage (days used) 4 hours 35 minutes  Median usage (days used) 4 hours 40 minutes  Total used hours (value since last reset - 2023) 422 hours  AirSense 11 AutoSet  Serial number [id number]  Mode AutoSet  Min Pressure 10 cmH2O  Max Pressure 15 cmH2O  EPR Ramp Only  EPR level 3  Response Standard  Therapy  Pressure - cmH2O Median: 10.5 95th percentile: 12.0 Maximum: 12.8  Leaks - L/min Median: 14.1 95th percentile: 41.0 Maximum: 49.1  Events per hour AI: 0.2 HI: 0.2 AHI: 0.4  Apnea Index Central: 0.1 Obstructive: 0.1 Unknown: 0.0  RERA Index 0.1  Cheyne-Mcnamara respiration (average duration per night) 0 minutes (0%)

## 2023-07-07 NOTE — TELEPHONE ENCOUNTER
Pt aware nurse refaxed order for full face mask to [facility]. Pt aware to follow up in about 5 weeks. Pt verbalize clear understanding and agrees to plan.   [phone] (home)

## 2023-07-10 ENCOUNTER — TELEPHONE (OUTPATIENT)
Facility: CLINIC | Age: 33
End: 2023-07-10

## 2023-07-10 DIAGNOSIS — G47.33 OSA (OBSTRUCTIVE SLEEP APNEA): Primary | ICD-10-CM

## 2023-07-10 NOTE — TELEPHONE ENCOUNTER
----- Message from RENITA Marie sent at 7/7/2023 12:41 PM CDT -----  Regarding: order, leak, f/u appt  Alfonso Mendosa and Fabrizio Bains send order to [address] [place] [address] for Full face mask, call pt about being able to check for leak on machine and schedule a followup THV in 5 weeks with me.    Rita Bella

## 2023-07-12 ENCOUNTER — TELEPHONE (OUTPATIENT)
Facility: CLINIC | Age: 33
End: 2023-07-12

## 2023-07-12 NOTE — TELEPHONE ENCOUNTER
----- Message from RENITA Carrera sent at 7/7/2023 12:41 PM CDT -----  Regarding: order, leak, f/u appt  Alfonso Mendosa and Fabrizio Bains send order to [employer] [place] for Full face mask, call pt about being able to check for leak on machine and schedule a followup THV in 5 weeks with me.    Rita Bella

## 2023-08-01 ENCOUNTER — TELEPHONE (OUTPATIENT)
Facility: CLINIC | Age: 33
End: 2023-08-01

## 2023-08-08 ENCOUNTER — HOSPITAL ENCOUNTER (OUTPATIENT)
Age: 33
Discharge: HOME | End: 2023-08-08
Attending: EMERGENCY MEDICINE
Payer: COMMERCIAL

## 2023-08-08 VITALS
TEMPERATURE: 98 F | OXYGEN SATURATION: 98 % | BODY MASS INDEX: 42.27 KG/M2 | HEIGHT: 66 IN | HEART RATE: 70 BPM | SYSTOLIC BLOOD PRESSURE: 146 MMHG | DIASTOLIC BLOOD PRESSURE: 87 MMHG | WEIGHT: 263 LBS | RESPIRATION RATE: 20 BRPM

## 2023-08-08 DIAGNOSIS — U07.1 COVID-19: Primary | ICD-10-CM

## 2023-08-08 LAB
S PYO AG THROAT QL IA.RAPID: NEGATIVE
SARS-COV-2 RNA RESP QL NAA+PROBE: DETECTED

## 2023-08-08 PROCEDURE — 87651 STREP A DNA AMP PROBE: CPT | Performed by: EMERGENCY MEDICINE

## 2023-08-08 PROCEDURE — 99203 OFFICE O/P NEW LOW 30 MIN: CPT

## 2023-08-08 PROCEDURE — 99213 OFFICE O/P EST LOW 20 MIN: CPT

## 2023-08-08 NOTE — ED INITIAL ASSESSMENT (HPI)
Patient presents to IC with URI symptoms ie sore throat ear pain and nonproductive cough since last night. No fever.

## 2023-08-11 NOTE — PROGRESS NOTES
This is a 27year old male who presents with the following symptoms, risk factors, behaviors or other items associated with sleep problems. Sleep Apnea:   snoring; non-refreshing sleep; overweight; high blood pressure; diabetes; current CPAP use  Insomnia:  not getting enough sleep  Restless Leg:  no symptoms or restless legs  Parasomnias:   no symptoms of parasomnias  Daytime Problems:  sleepiness; fatigue; dificulty concentrating; memory problems; dozing off unintentionally; sleepy while driving; dozing off at work; previous sleep study    The patient's [place] Sleepiness score is 13/24.

## 2023-08-11 NOTE — PROGRESS NOTES
Alexander Eppsmiley  2023 - 2023  Patient ID: [id number]  : 10/23/1990  Age: 27 years   E [facility]  Compliance Report  Usage 2023 - 2023  Usage days 29/30 days (97%)  >= 4 hours 29 days (97%)  < 4 hours 0 days (0%)  Usage hours 187 hours 28 minutes  Average usage (total days) 6 hours 15 minutes  Average usage (days used) 6 hours 28 minutes  Median usage (days used) 6 hours 31 minutes  Total used hours (value since last reset - 2023) 582 hours  AirSense 11 AutoSet  Serial number [id number]  Mode AutoSet  Min Pressure 10 cmH2O  Max Pressure 15 cmH2O  EPR Ramp Only  EPR level 3  Response Standard  Therapy  Pressure - cmH2O Median: 10.5 95th percentile: 12.3 Maximum: 13.4  Leaks - L/min Median: 9.6 95th percentile: 41.3 Maximum: 50.9  Events per hour AI: 0.2 HI: 0.2 AHI: 0.4  Apnea Index Central: 0.0 Obstructive: 0.2 Unknown: 0.0  RERA Index 0.2  Cheyne-Mcnamara respiration (average duration per night) 0 minutes (0%

## 2023-08-14 ENCOUNTER — TELEMEDICINE (OUTPATIENT)
Facility: CLINIC | Age: 33
End: 2023-08-14
Payer: COMMERCIAL

## 2023-08-14 DIAGNOSIS — G47.33 OSA (OBSTRUCTIVE SLEEP APNEA): Primary | ICD-10-CM

## 2023-08-14 PROCEDURE — 99214 OFFICE O/P EST MOD 30 MIN: CPT | Performed by: NURSE PRACTITIONER

## 2023-08-23 RX ORDER — LOSARTAN POTASSIUM 100 MG/1
100 TABLET ORAL DAILY
Qty: 90 TABLET | Refills: 1 | Status: SHIPPED | OUTPATIENT
Start: 2023-08-23

## 2023-10-10 ENCOUNTER — PATIENT MESSAGE (OUTPATIENT)
Dept: FAMILY MEDICINE CLINIC | Facility: CLINIC | Age: 33
End: 2023-10-10

## 2023-10-11 ENCOUNTER — TELEMEDICINE (OUTPATIENT)
Dept: FAMILY MEDICINE CLINIC | Facility: CLINIC | Age: 33
End: 2023-10-11
Payer: COMMERCIAL

## 2023-10-11 DIAGNOSIS — I10 PRIMARY HYPERTENSION: Primary | ICD-10-CM

## 2023-10-11 PROCEDURE — 99213 OFFICE O/P EST LOW 20 MIN: CPT | Performed by: FAMILY MEDICINE

## 2023-10-11 NOTE — PROGRESS NOTES
Subjective:   Patient ID: Cindy Rondon is a 27year old male. HPI  Mr. Claudell Bue is a very pleasant 26-year-old gentleman with history of hypertension presents for video visit today. He and his wife have been going to the fertility clinic and was advised not to take amlodipine. He also takes losartan and hydrochlorothiazide and does not report side effects. He just wants to make sure that this is okay for him to stop. Otherwise he feels well. I had reviewed past medical and family histories together with allergy and medication lists documented. History/Other:   Review of Systems   Respiratory:  Negative for cough and shortness of breath. Cardiovascular:  Negative for chest pain. Current Outpatient Medications   Medication Sig Dispense Refill    losartan 100 MG Oral Tab Take 1 tablet (100 mg total) by mouth daily. 90 tablet 1    amLODIPine 10 MG Oral Tab Take 1 tablet (10 mg total) by mouth daily. 90 tablet 1    hydroCHLOROthiazide 25 MG Oral Tab Take 1 tablet (25 mg total) by mouth daily. 90 tablet 0    hydroCHLOROthiazide 25 MG Oral Tab Take 1 tablet (25 mg total) by mouth daily. 90 tablet 0     Allergies:No Known Allergies    Objective:   Physical Exam  Constitutional:       General: He is not in acute distress. Neurological:      Mental Status: He is alert. Assessment & Plan:   Primary hypertension  (primary encounter diagnosis)  -We will follow recommendations per OB/GYN  - We will hold amlodipine  - Continue with losartan and hydrochlorothiazide  - Monitor blood pressure and give me an update after 2 weeks. Call or come in sooner if with further questions or concerns    This note was prepared using [employer] [place] [employer] voice recognition dictation software. As a result errors may occur. When identified these errors have been corrected.  While every attempt is made to correct errors during dictation discrepancies may still exist          No orders of the defined types were placed in this encounter.       Meds This Visit:  Requested Prescriptions      No prescriptions requested or ordered in this encounter       Imaging & Referrals:  None

## 2023-11-20 ENCOUNTER — LAB ENCOUNTER (OUTPATIENT)
Dept: LAB | Age: 33
End: 2023-11-20
Attending: FAMILY MEDICINE
Payer: COMMERCIAL

## 2023-11-20 DIAGNOSIS — E11.65 TYPE 2 DIABETES MELLITUS WITH HYPERGLYCEMIA, WITHOUT LONG-TERM CURRENT USE OF INSULIN (HCC): ICD-10-CM

## 2023-11-20 LAB
ALBUMIN SERPL-MCNC: 3.8 G/DL (ref 3.4–5)
ALBUMIN/GLOB SERPL: 1 {RATIO} (ref 1–2)
ALP LIVER SERPL-CCNC: 49 U/L
ALT SERPL-CCNC: 30 U/L
ANION GAP SERPL CALC-SCNC: 4 MMOL/L (ref 0–18)
AST SERPL-CCNC: 20 U/L (ref 15–37)
BASOPHILS # BLD AUTO: 0.03 X10(3) UL (ref 0–0.2)
BASOPHILS NFR BLD AUTO: 0.4 %
BILIRUB SERPL-MCNC: 0.8 MG/DL (ref 0.1–2)
BUN BLD-MCNC: 16 MG/DL (ref 9–23)
CALCIUM BLD-MCNC: 9.1 MG/DL (ref 8.5–10.1)
CHLORIDE SERPL-SCNC: 106 MMOL/L (ref 98–112)
CHOLEST SERPL-MCNC: 154 MG/DL (ref ?–200)
CO2 SERPL-SCNC: 30 MMOL/L (ref 21–32)
CREAT BLD-MCNC: 0.63 MG/DL
CREAT UR-SCNC: 198 MG/DL
EGFRCR SERPLBLD CKD-EPI 2021: 127 ML/MIN/1.73M2 (ref 60–?)
EOSINOPHIL # BLD AUTO: 0.09 X10(3) UL (ref 0–0.7)
EOSINOPHIL NFR BLD AUTO: 1.2 %
ERYTHROCYTE [DISTWIDTH] IN BLOOD BY AUTOMATED COUNT: 13.4 %
EST. AVERAGE GLUCOSE BLD GHB EST-MCNC: 100 MG/DL (ref 68–126)
FASTING PATIENT LIPID ANSWER: YES
FASTING STATUS PATIENT QL REPORTED: YES
GLOBULIN PLAS-MCNC: 3.8 G/DL (ref 2.8–4.4)
GLUCOSE BLD-MCNC: 86 MG/DL (ref 70–99)
HBA1C MFR BLD: 5.1 % (ref ?–5.7)
HCT VFR BLD AUTO: 44 %
HDLC SERPL-MCNC: 43 MG/DL (ref 40–59)
HGB BLD-MCNC: 14.7 G/DL
IMM GRANULOCYTES # BLD AUTO: 0.01 X10(3) UL (ref 0–1)
IMM GRANULOCYTES NFR BLD: 0.1 %
LDLC SERPL CALC-MCNC: 98 MG/DL (ref ?–100)
LYMPHOCYTES # BLD AUTO: 2.31 X10(3) UL (ref 1–4)
LYMPHOCYTES NFR BLD AUTO: 31.2 %
MCH RBC QN AUTO: 29 PG (ref 26–34)
MCHC RBC AUTO-ENTMCNC: 33.4 G/DL (ref 31–37)
MCV RBC AUTO: 86.8 FL
MICROALBUMIN UR-MCNC: 7.5 MG/DL
MICROALBUMIN/CREAT 24H UR-RTO: 37.9 UG/MG (ref ?–30)
MONOCYTES # BLD AUTO: 0.46 X10(3) UL (ref 0.1–1)
MONOCYTES NFR BLD AUTO: 6.2 %
NEUTROPHILS # BLD AUTO: 4.51 X10 (3) UL (ref 1.5–7.7)
NEUTROPHILS # BLD AUTO: 4.51 X10(3) UL (ref 1.5–7.7)
NEUTROPHILS NFR BLD AUTO: 60.9 %
NONHDLC SERPL-MCNC: 111 MG/DL (ref ?–130)
OSMOLALITY SERPL CALC.SUM OF ELEC: 290 MOSM/KG (ref 275–295)
PLATELET # BLD AUTO: 296 10(3)UL (ref 150–450)
POTASSIUM SERPL-SCNC: 3.6 MMOL/L (ref 3.5–5.1)
PROT SERPL-MCNC: 7.6 G/DL (ref 6.4–8.2)
RBC # BLD AUTO: 5.07 X10(6)UL
SODIUM SERPL-SCNC: 140 MMOL/L (ref 136–145)
TRIGL SERPL-MCNC: 68 MG/DL (ref 30–149)
VLDLC SERPL CALC-MCNC: 11 MG/DL (ref 0–30)
WBC # BLD AUTO: 7.4 X10(3) UL (ref 4–11)

## 2023-11-20 PROCEDURE — 3060F POS MICROALBUMINURIA REV: CPT | Performed by: FAMILY MEDICINE

## 2023-11-20 PROCEDURE — 36415 COLL VENOUS BLD VENIPUNCTURE: CPT

## 2023-11-20 PROCEDURE — 3061F NEG MICROALBUMINURIA REV: CPT | Performed by: FAMILY MEDICINE

## 2023-11-20 PROCEDURE — 82570 ASSAY OF URINE CREATININE: CPT

## 2023-11-20 PROCEDURE — 3044F HG A1C LEVEL LT 7.0%: CPT | Performed by: FAMILY MEDICINE

## 2023-11-20 PROCEDURE — 80053 COMPREHEN METABOLIC PANEL: CPT

## 2023-11-20 PROCEDURE — 82043 UR ALBUMIN QUANTITATIVE: CPT

## 2023-11-20 PROCEDURE — 83036 HEMOGLOBIN GLYCOSYLATED A1C: CPT

## 2023-11-20 PROCEDURE — 80061 LIPID PANEL: CPT

## 2023-11-20 PROCEDURE — 85025 COMPLETE CBC W/AUTO DIFF WBC: CPT

## 2023-11-21 ENCOUNTER — OFFICE VISIT (OUTPATIENT)
Dept: FAMILY MEDICINE CLINIC | Facility: CLINIC | Age: 33
End: 2023-11-21
Payer: COMMERCIAL

## 2023-11-21 VITALS
HEIGHT: 66 IN | BODY MASS INDEX: 41.3 KG/M2 | SYSTOLIC BLOOD PRESSURE: 154 MMHG | RESPIRATION RATE: 16 BRPM | DIASTOLIC BLOOD PRESSURE: 90 MMHG | WEIGHT: 257 LBS | TEMPERATURE: 98 F | OXYGEN SATURATION: 98 % | HEART RATE: 74 BPM

## 2023-11-21 DIAGNOSIS — M25.511 CHRONIC RIGHT SHOULDER PAIN: ICD-10-CM

## 2023-11-21 DIAGNOSIS — I10 PRIMARY HYPERTENSION: ICD-10-CM

## 2023-11-21 DIAGNOSIS — G89.29 CHRONIC RIGHT SHOULDER PAIN: ICD-10-CM

## 2023-11-21 DIAGNOSIS — E11.65 TYPE 2 DIABETES MELLITUS WITH HYPERGLYCEMIA, WITHOUT LONG-TERM CURRENT USE OF INSULIN (HCC): Primary | ICD-10-CM

## 2023-11-21 PROCEDURE — 3080F DIAST BP >= 90 MM HG: CPT | Performed by: FAMILY MEDICINE

## 2023-11-21 PROCEDURE — 99214 OFFICE O/P EST MOD 30 MIN: CPT | Performed by: FAMILY MEDICINE

## 2023-11-21 PROCEDURE — 3008F BODY MASS INDEX DOCD: CPT | Performed by: FAMILY MEDICINE

## 2023-11-21 PROCEDURE — 3077F SYST BP >= 140 MM HG: CPT | Performed by: FAMILY MEDICINE

## 2023-11-21 NOTE — PATIENT INSTRUCTIONS
Thank you for choosing Laurel Berman MD at [place] [address]  To Do: Kenia Zuletamagallo  1. Please see age appropriate health prevention below    [facility] is located in [address]. Monday, Tuesday & Friday - 8 a.m. to 4 p.m. Wednesday, Thursday - 7 a.m. to 3 p.m. The lab is closed daily from 12 p.m.-12:30 p.m. Saturday lab hours by appointment. Call [phone] to schedule the appointment. Please signup for [employer] Software, which is electronic access to your record if you have not done so. All your results will post on there. [email or web address]   You can NOW use Rally Software to book your appointments with us, or consider using open access scheduling which is through the edward website [email or web address] "[email or web address] and type in Laurel Berman MD and follow the links for \"Schedule Online Now\"    To schedule Imaging or tests at [facility] Scheduling [phone], Go to [facility] A ER Building (For example: CT scans, X rays, Ultrasound, MRI)  Cardiac Testing in ER building Building A second floor Cardiac Testing [phone] (For example: Holter Monitor, Cardiac Stress tests,Event Monitor, or 2D Echocardiograms)  [facility] call [phone] usually in [facility] A  Walk in Clinic in [place] at [facility]. [address] Mon-Fri at 8am-7:30 p.m., and Sat/Sun 9:00a. m.-4:30 p.m. Also at [address]  Call [phone] for info     Please call our office about any questions regarding your treatment/medicines/tests as a result of today's visit. For your safety, read the entire package insert of all medicines prescribed to you and be aware of all of the risks of treatment even beyond those discussed today. All therapies have potential risk of harm or side effects or medication interactions.   It is your duty and for your safety to discuss with the pharmacist and our office with questions, and to notify us and stop treatment if problems arise, but know that our intention is that the benefits outweigh those potential risks and we strive to make you healthier and to improve your quality of life. Referrals, and Radiology Information:    If your insurance requires a referral to a specialist, please allow 5 business days to process your referral request.    If Wilda Mendez MD orders a CT or MRI, it may take up to 10 business days to receive approval from your insurance company. Once our office has called informing you that the insurance company approved your testing, please call Central Scheduling at [phone]  Please allow our office 5 business days to contact you regarding any testing results. Refill policies:   Allow 3 business days for refills; controlled substances may take longer and must be picked up from the office in person. Narcotic medications can only be filled in 30 day increments and must be refilled at an office visit only. If your prescription is due for a refill, you may be due for a follow-up appointment. We cannot refill your maintenance medications at a preventative wellness visit. To best provide you care, patients receiving maintenance medications need to be seen at least twice a year.

## 2023-11-21 NOTE — PROGRESS NOTES
Subjective:   Patient ID: Bk Castaneda is a 26year old male. HPI  Mr. Rohit Robertson is a very pleasant 40-year-old gentleman with known history of diabetes mellitus, hypertension, hyperlipidemia. Blood pressure has been elevated as they are. Today doctor had recommended for him to stop amlodipine. He has been taking losartan and hydrochlorothiazide. He has been feeling well otherwise except for chronic right shoulder pain. It tends to require when he moves in certain ways. This has been ongoing since he was in middle school. No apparent injury that he reports. Pain is sharp and achy and would radiate down to his right mid upper arm. No fever no cough no chest pain or shortness of breath no nausea no vomiting no abdominal pain. History/Other:   Review of Systems   Constitutional:  Negative for fatigue and fever. HENT:  Negative for sore throat. Eyes:  Negative for visual disturbance. Respiratory:  Negative for cough and shortness of breath. Cardiovascular:  Negative for chest pain. Gastrointestinal:  Negative for abdominal pain, diarrhea, nausea and vomiting. Genitourinary:  Negative for difficulty urinating. Musculoskeletal:  Positive for arthralgias. Neurological:  Negative for dizziness. Current Outpatient Medications   Medication Sig Dispense Refill    losartan 100 MG Oral Tab Take 1 tablet (100 mg total) by mouth daily. 90 tablet 1    hydroCHLOROthiazide 25 MG Oral Tab Take 1 tablet (25 mg total) by mouth daily. 90 tablet 0    amLODIPine 10 MG Oral Tab Take 1 tablet (10 mg total) by mouth daily. (Patient not taking: Reported on 11/21/2023) 90 tablet 1     Allergies:No Known Allergies    Objective:   Physical Exam  Vitals reviewed. Constitutional:       General: He is not in acute distress. HENT:      Mouth/Throat:      Mouth: Mucous membranes are moist.      Pharynx: Oropharynx is clear. Eyes:      General: No scleral icterus.      Conjunctiva/sclera: Conjunctivae normal. Cardiovascular:      Rate and Rhythm: Normal rate and regular rhythm. Heart sounds: Normal heart sounds. No murmur heard. Pulmonary:      Effort: Pulmonary effort is normal. No respiratory distress. Breath sounds: Normal breath sounds. No wheezing or rales. Abdominal:      General: Bowel sounds are normal.      Palpations: Abdomen is soft. Musculoskeletal:      Right shoulder: Tenderness present. No swelling or bony tenderness. Normal range of motion. Normal strength. Right upper arm: Tenderness present. No swelling. Arms:       Cervical back: Neck supple. Right lower leg: No edema. Left lower leg: No edema. Lymphadenopathy:      Cervical: No cervical adenopathy. Neurological:      Mental Status: He is alert. Psychiatric:         Mood and Affect: Mood normal.         Behavior: Behavior normal.         Assessment & Plan:   1. Type 2 diabetes mellitus with hyperglycemia, without long-term current use of insulin (HCC)   -Well-controlled with diet  - Will continue to monitor-   2. Primary hypertension   -Currently off amlodipine as advised by fertility specialist  - Will monitor at this point  - Continue with losartan and hydrochlorothiazide   3. Chronic right shoulder pain   -Will refer to physical therapy for further evaluation management     Follow-up after 6 months or as needed      This note was prepared using [employer] voice recognition dictation software. As a result errors may occur. When identified these errors have been corrected. While every attempt is made to correct errors during dictation discrepancies may still exist          No orders of the defined types were placed in this encounter.       Meds This Visit:  Requested Prescriptions      No prescriptions requested or ordered in this encounter       Imaging & Referrals:  OPHTHALMOLOGY - INTERNAL  OP REFERRAL TO Karan Lara

## 2023-12-18 RX ORDER — HYDROCHLOROTHIAZIDE 25 MG/1
25 TABLET ORAL DAILY
Qty: 90 TABLET | Refills: 0 | Status: SHIPPED | OUTPATIENT
Start: 2023-12-18

## 2023-12-26 RX ORDER — AMLODIPINE BESYLATE 10 MG/1
10 TABLET ORAL DAILY
Qty: 90 TABLET | Refills: 1 | Status: SHIPPED | OUTPATIENT
Start: 2023-12-26

## 2024-01-09 ENCOUNTER — TELEPHONE (OUTPATIENT)
Dept: PHYSICAL THERAPY | Facility: HOSPITAL | Age: 34
End: 2024-01-09

## 2024-01-10 ENCOUNTER — OFFICE VISIT (OUTPATIENT)
Dept: PHYSICAL THERAPY | Age: 34
End: 2024-01-10
Attending: FAMILY MEDICINE
Payer: COMMERCIAL

## 2024-01-10 DIAGNOSIS — G89.29 CHRONIC RIGHT SHOULDER PAIN: Primary | ICD-10-CM

## 2024-01-10 DIAGNOSIS — M25.511 CHRONIC RIGHT SHOULDER PAIN: Primary | ICD-10-CM

## 2024-01-10 PROCEDURE — 97110 THERAPEUTIC EXERCISES: CPT

## 2024-01-10 PROCEDURE — 97161 PT EVAL LOW COMPLEX 20 MIN: CPT

## 2024-01-10 NOTE — PROGRESS NOTES
SHOULDER EVALUATION:     Diagnosis:   Chronic right shoulder pain (M25.511,G89.29)       Referring Provider: Taya  Date of Evaluation:    1/10/2024    Precautions:  None Next MD visit:   none scheduled  Date of Surgery: n/a     PATIENT SUMMARY   Mil Haley is a 33 year old male who presents to therapy today with complaints of R shoulder pain that radiates to the R upper arm of insidious onset that has been present since middle school when trying to throw a ball. He notices the pain more now because he has been working out more. Tricep push downs or assisted dips and overhead tricep extension causes the pain. Feels the tricep on the right is weaker as he can do more weight on the left arm. The pain is in the front of the shoulder and radiates to the mid anterior upper arm. Notices that the R shoulder sits lower than the left  R handed.  No imaging.  Pt describes pain level current 0/10, 3-4/10 at worst  Current functional limitations include throwing a ball, working out including tricep movements, sleeping on the R shoulder.     Mil describes prior level of function as normal. Pt goals include to reduce the pain in the shoulder and to improve muscle definition on the L as R; throw a ball again.  Past medical history was reviewed with Mil. Significant findings include a history of diabetes  Patient Active Problem List   Diagnosis    Mixed hyperlipidemia    Type 2 diabetes mellitus with hyperglycemia, without long-term current use of insulin (HCC)    Hypertension    Morbid obesity due to excess calories (HCC)    JESSIE (obstructive sleep apnea)     Past Medical History:   Diagnosis Date    Diabetes (HCC)     Essential hypertension     Kidney stone     Obesity     8/20/2021    Sleep apnea     From sleep study early January 2023     Past Surgical History:   Procedure Laterality Date    CHOLECYSTECTOMY      REMOVAL OF KIDNEY STONE           ASSESSMENT  Mil presents to physical therapy evaluation with  primary c/o R anterior shoulder pain. The results of the objective tests and measures show poor JM posterior motion, muscle length deficits through lat, poor posture, weakness and ROM limitation.  Functional deficits include but are not limited to throwing a ball, working out including tricep movements, sleeping on the R shoulder.  Signs and symptoms are consistent with diagnosis of chronic R shoulder pain. Pt and PT discussed evaluation findings, pathology, POC and HEP.  Pt voiced understanding and performs HEP correctly without reported pain. Skilled Physical Therapy is medically necessary to address the above impairments and reach functional goals.     OBJECTIVE:   Observation/Posture: slight FHP rounded shoulders  Palpation: TTP at subscap; no TTP through posterior shoulder, UT, pec; tension through lat significantly       AROM: (* denotes performed with pain)  Shoulder    Flexion: R 150; L 160  Abduction: R 160; L 170  ER: R T3*; L T3  IR: R T11; L T11     Accessory motion: posterior GH JM limited, inferior WNL    Strength/MMT: (* denotes performed with pain)  Shoulder Elbow Scapular   Flexion: R 4+/5; L 5/5  Abduction: R 4+/5; L 5/5  ER: R 4/5; L 5/5  IR: R 4/5; L 5/5  Supraspinatus: R 4+/5, L 5/5  Teres Minor: R 4/5, L 5/5  Subscapularis: R 4+/5, L 5/5   Flexion: R 5/5; L 5/5  Extension: R 4/5; L 5/5 Rhomboids: R 4+/5, L 5/5  Mid trap: R 4+/5; L 5/5   Low trap: R 4/5; L 4+/5     Special tests:   Labrum Special Testing:   Biceps Load II: R +, L -    Instability Special Testing:   Apprehension Test: R -, L -  Sulcus Sign: R -, L -    Subacromial Pain Syndrome:  Empty Can test: R +, L -  Painful Arc Sign: R -, L -  External Rotation Resistance: R +, L -  Harrington-Jonathan Impingement Sign: R +, L -  Neer Impingement Test: R +, L -  Palpable Tenderness Infraspinatus and Supraspinatus: R -, L -    Long Head Biceps Tendinopathy:   Speed Test: R +, L -        Today’s Treatment and Response:   Pt education was provided  on exam findings, treatment diagnosis, treatment plan, expectations, and prognosis. Pt was also provided recommendations for activity modifications, possible soreness after evaluation, and modalities as needed [ice/heat].  Patient was instructed in and issued a HEP for:   Access Code: [email or web address]  URL: [email or web address]  Date: 01/10/2024  Prepared by: Francie Brooke    Exercises  - Shoulder External Rotation with Anchored Resistance  - 1 x daily - 7 x weekly - 3 sets - 10 reps - red band  - Shoulder Internal Rotation with Resistance  - 1 x daily - 7 x weekly - 3 sets - 10 reps - red band  - Shoulder Extension with Resistance - Palms Forward  - 1 x daily - 4 x weekly - 3 sets - 10 reps - red band  - Single Arm Scaption with Resistance  - 1 x daily - 7 x weekly - 3 sets - 10 reps - red band  - Doorway Pec Stretch at 90 Degrees Abduction  - 1 x daily - 7 x weekly - 2 sets - 30\" hold  - Doorway Pec Stretch at 60 Elevation  - 1 x daily - 7 x weekly - 2 sets - 30\" hold  - Standing Posterior Cuff Stretch  - 1 x daily - 7 x weekly - 2 sets - 30\" hold    Charges: PT Eval Low Complexity, 20      Total Timed Treatment: 15 min     Total Treatment Time: 35 min     PLAN OF CARE:    Goals: (to be met in 10 visits)   Pt will report improved ability to sleep without waking due to shoulder pain   Pt will improve shoulder flexion AROM to >160 degrees to be able to reach into overhead cabinets without pain or restriction   Pt will improve shoulder abduction AROM to >170 degrees to improve ability to don deodorant, don/doff shirts, and wash hair   Pt will improve shoulder strength throughout to 5/5 to improve function with gym activities    Pt will demonstrate increased mid/low trap strength to 5/5 to promote improved shoulder mechanics and stabilization with lifting and reaching   Pt will be independent and compliant with comprehensive HEP to maintain progress achieved in PT       Frequency / Duration: Patient will be seen for  2 x/week or a total of 10 visits over a 90 day period. Treatment will include: Manual Therapy, Neuromuscular Re-education, Therapeutic Activities, Therapeutic Exercise, Home Exercise Program instruction, and Modalities to include: Electrical stimulation (unattended)    Education or treatment limitation: None  Rehab Potential:excellent    QuickDASH Outcome Score  Score: 27.27 % (1/10/2024  8:25 AM)      Patient/Family/Caregiver was advised of these findings, precautions, and treatment options and has agreed to actively participate in planning and for this course of care.    Thank you for your referral. Please co-sign or sign and return this letter via fax as soon as possible to [phone]. If you have any questions, please contact me at Dept: [phone]    Sincerely,  Electronically signed by therapist: Francie Brooke PT  Physician's certification required: Yes  I certify the need for these services furnished under this plan of treatment and while under my care.    X___________________________________________________ Date____________________    Certification From: 1/10/2024  To:4/9/2024

## 2024-01-17 ENCOUNTER — OFFICE VISIT (OUTPATIENT)
Dept: PHYSICAL THERAPY | Age: 34
End: 2024-01-17
Attending: FAMILY MEDICINE
Payer: COMMERCIAL

## 2024-01-17 PROCEDURE — 97110 THERAPEUTIC EXERCISES: CPT

## 2024-01-17 PROCEDURE — 97112 NEUROMUSCULAR REEDUCATION: CPT

## 2024-01-18 ENCOUNTER — APPOINTMENT (OUTPATIENT)
Dept: PHYSICAL THERAPY | Age: 34
End: 2024-01-18
Attending: FAMILY MEDICINE
Payer: COMMERCIAL

## 2024-01-25 ENCOUNTER — OFFICE VISIT (OUTPATIENT)
Dept: PHYSICAL THERAPY | Age: 34
End: 2024-01-25
Attending: FAMILY MEDICINE
Payer: COMMERCIAL

## 2024-01-25 PROCEDURE — 97110 THERAPEUTIC EXERCISES: CPT

## 2024-01-25 NOTE — PROGRESS NOTES
Diagnosis:   Chronic right shoulder pain (M25.511,G89.29)       Referring Provider: Taya  Date of Evaluation:    1/10/2024    Precautions:  None Next MD visit:   none scheduled  Date of Surgery: n/a   Insurance Primary/Secondary: [employer] [place] HMO / N/A     # Auth Visits: 5 visits 1/1-3/31            Subjective: Patient reports feeling pain taking the trash out yesterday that wasn't this bad in a long time. Doing the exercises helped he states.     Pain: 4/10 yesterday, 1-2/10 upon arrival      Objective: limited posterior GH JM with tension through posterior cuff.       Assessment: Patient able to tolerate today's tx well demonstrating improvements in RTC and scapular strength. Progressed w/ higher intensity shoulder stabilization exercises including ER isometric walkouts.          Goals:   (to be met in 10 visits)   Pt will report improved ability to sleep without waking due to shoulder pain   Pt will improve shoulder flexion AROM to >160 degrees to be able to reach into overhead cabinets without pain or restriction   Pt will improve shoulder abduction AROM to >170 degrees to improve ability to don deodorant, don/doff shirts, and wash hair   Pt will improve shoulder strength throughout to 5/5 to improve function with gym activities    Pt will demonstrate increased mid/low trap strength to 5/5 to promote improved shoulder mechanics and stabilization with lifting and reaching   Pt will be independent and compliant with comprehensive HEP to maintain progress achieved in PT        Plan: assess tolerance to exercises; schedule 5 more per POC if he would like.   Frequency / Duration: Patient will be seen for 2 x/week or a total of 10 visits over a 90 day period. Treatment will include: Manual Therapy, Neuromuscular Re-education, Therapeutic Activities, Therapeutic Exercise, Home Exercise Program instruction, and Modalities to include: Electrical stimulation (unattended)   Date: 1/17/2024  TX#: 2/5 Date:  1/25/24                TX#: 3/5 Date:                 TX#: 4/ Date:                 TX#: 5/ Date:   Tx#: 6/   Manual: 5 min  STM: posterior cuff  JM: posterior and inferior R GH JM GR III/IV   Manual: 5 min  STM: posterior cuff  JM: posterior and inferior R GH JM GR III/IV      Ther-Ex: 30 min  UBE 2'/2'   R shoulder PROM  Manual posterior cuff stretch supine, 2x30\"   Modified sleeper stretch, 2x30\"   Sidelying ER, 3#, 2x15  Sidelying HABD, 3#, 2x15  TRX row, 2x10  TRX pec stretch, 3 way, 1x30\" ea   Resisted HADD, 20#, 2x10  Standing abduction, 8#, ecc lower with scap squeeze, 2x10  Resisted HABD, green TB, 2x10  Resisted ER bilat, green TB, 2x10   Open book at wall, 1x15 B      Ther-Ex: 30 min  UBE 2'/2'   Modified sleeper stretch 2 x 30\"  HABD at wall BTB 2 x 15  Low trap setting YTB loop 2 x 10  ER isometric walkouts pink tubing 2 x 10  ER isometric chest press pink tubing 2 x 10  ER isometric OHP YTB loop 2 x 10  YTB flutters to overhead x 10  Single arm chest press on shuttle w/ RSB 6lb 2 x 15          NMR: 10 min  RS: 60, 90, 120 deg of 30\" ea  Supine serratus press, 5#, 2x10  Scap clock, red TB, 1'   Serratus wall slide, 2x10              HEP: Access Code: [email or web address]  URL: [email or web address]  Date: 01/25/2024  Prepared by: Russel Smith    Exercises  - Shoulder External Rotation with Anchored Resistance  - 1 x daily - 7 x weekly - 3 sets - 10 reps - green band  - Shoulder Internal Rotation with Resistance  - 1 x daily - 7 x weekly - 3 sets - 10 reps - green band  - Shoulder Extension with Resistance - Palms Forward  - 1 x daily - 4 x weekly - 3 sets - 10 reps - green band  - Single Arm Scaption with Resistance  - 1 x daily - 7 x weekly - 3 sets - 10 reps - green band  - Shoulder Flexion Serratus Activation with Resistance  - 2 x daily - 7 x weekly - 2 sets - 10 reps - 3 hold  - Doorway Pec Stretch at 90 Degrees Abduction  - 1 x daily - 7 x weekly - 2 sets - 30\" hold  - Doorway Pec Stretch at 60 Elevation  - 1 x daily -  7 x weekly - 2 sets - 30\" hold  - Standing Posterior Cuff Stretch  - 1 x daily - 7 x weekly - 2 sets - 30\" hold    Charges:3 TE         Total Timed Treatment: 45 min  Total Treatment Time: 45 min

## 2024-02-01 ENCOUNTER — OFFICE VISIT (OUTPATIENT)
Dept: PHYSICAL THERAPY | Age: 34
End: 2024-02-01
Attending: FAMILY MEDICINE
Payer: COMMERCIAL

## 2024-02-01 PROCEDURE — 97110 THERAPEUTIC EXERCISES: CPT

## 2024-02-01 NOTE — PROGRESS NOTES
Diagnosis:   Chronic right shoulder pain (M25.511,G89.29)       Referring Provider: Taya  Date of Evaluation:    1/10/2024    Precautions:  None Next MD visit:   none scheduled  Date of Surgery: n/a   Insurance Primary/Secondary: [employer] [place] HMO / N/A     # Auth Visits: 5 visits 1/1-3/31            Subjective: Patient reports that he is feeling better today compared to last visit.     Pain: , 0/10 upon arrival      Objective: impaired shoulder flexion and abd, improved w/ wall stretch       Assessment: Patient able to tolerate today's tx well demonstrating improvements in RTC and scapular strength. He does demonstrate continued weakness in low trap strength and endurance. Was progressed w/ higher intensity stability exercises such as ER at 90 abd w/ resistance. HEP updated to continue strengthening of the RTC.         Goals:   (to be met in 10 visits)   Pt will report improved ability to sleep without waking due to shoulder pain   Pt will improve shoulder flexion AROM to >160 degrees to be able to reach into overhead cabinets without pain or restriction   Pt will improve shoulder abduction AROM to >170 degrees to improve ability to don deodorant, don/doff shirts, and wash hair   Pt will improve shoulder strength throughout to 5/5 to improve function with gym activities    Pt will demonstrate increased mid/low trap strength to 5/5 to promote improved shoulder mechanics and stabilization with lifting and reaching   Pt will be independent and compliant with comprehensive HEP to maintain progress achieved in PT        Plan: PN for additional auth   Frequency / Duration: Patient will be seen for 2 x/week or a total of 10 visits over a 90 day period. Treatment will include: Manual Therapy, Neuromuscular Re-education, Therapeutic Activities, Therapeutic Exercise, Home Exercise Program instruction, and Modalities to include: Electrical stimulation (unattended)   Date: 1/17/2024  TX#: 2/5 Date:  1/25/24               TX#:  3/5 Date: 2/1/24                TX#: 4/5 Date:                 TX#: 5/ Date:   Tx#: 6/   Manual: 5 min  STM: posterior cuff  JM: posterior and inferior R GH JM GR III/IV   Manual: 5 min  STM: posterior cuff  JM: posterior and inferior R GH JM GR III/IV      Ther-Ex: 30 min  UBE 2'/2'   R shoulder PROM  Manual posterior cuff stretch supine, 2x30\"   Modified sleeper stretch, 2x30\"   Sidelying ER, 3#, 2x15  Sidelying HABD, 3#, 2x15  TRX row, 2x10  TRX pec stretch, 3 way, 1x30\" ea   Resisted HADD, 20#, 2x10  Standing abduction, 8#, ecc lower with scap squeeze, 2x10  Resisted HABD, green TB, 2x10  Resisted ER bilat, green TB, 2x10   Open book at wall, 1x15 B      Ther-Ex: 30 min  UBE 2'/2'   Modified sleeper stretch 2 x 30\"  HABD at wall BTB 2 x 15  Low trap setting YTB loop 2 x 10  ER isometric walkouts pink tubing 2 x 10  ER isometric chest press pink tubing 2 x 10  ER isometric OHP YTB loop 2 x 10  YTB flutters to overhead x 10  Single arm chest press on shuttle w/ RSB 6lb 2 x 15     Ther-Ex: 45 min  UBE 2'/2'   B/L ER RTB 2 x 20  Single arm doorway pec stretch at 90 abd/at side 3 x 20 sec  Wall slide lat stretch w/ towel 10 x 5 sec  ER isometric chest press RTB loop 2 x 15  HABD to chest w/ BTB 2 x 15  Bent over Y raises 1lb 2 x 15  Single arm ER at 90 deg abd red tubing 2 x 15  Body blade shakes punches/horizontal abd+add/flex+ext x 1min each     NMR: 10 min  RS: 60, 90, 120 deg of 30\" ea  Supine serratus press, 5#, 2x10  Scap clock, red TB, 1'   Serratus wall slide, 2x10              HEP: Access Code: [email or web address]  URL: [email or web address]  Date: 02/01/2024  Prepared by: Russel Smith    Exercises  - Shoulder External Rotation with Anchored Resistance  - 1 x daily - 7 x weekly - 3 sets - 10 reps - green band  - Shoulder Internal Rotation with Resistance  - 1 x daily - 7 x weekly - 3 sets - 10 reps - green band  - Shoulder Extension with Resistance - Palms Forward  - 1 x daily - 4 x weekly - 3 sets - 10 reps -  green band  - Standing Single Arm Shoulder External Rotation in Abduction with Anchored Resistance  - 2 x daily - 7 x weekly - 2 sets - 10 reps - 3 hold  - Shoulder External Rotation and Scapular Retraction with Resistance  - 2 x daily - 7 x weekly - 2 sets - 10 reps - 3 hold  - Single Arm Scaption with Resistance  - 1 x daily - 7 x weekly - 3 sets - 10 reps - green band  - Shoulder Flexion Serratus Activation with Resistance  - 2 x daily - 7 x weekly - 2 sets - 10 reps - 3 hold  - Doorway Pec Stretch at 60 Elevation  - 1 x daily - 7 x weekly - 2 sets - 30\" hold  - Standing Posterior Cuff Stretch  - 1 x daily - 7 x weekly - 2 sets - 30\" hold    Charges:3 TE         Total Timed Treatment: 45 min  Total Treatment Time: 45 min

## 2024-02-06 ENCOUNTER — TELEPHONE (OUTPATIENT)
Dept: PHYSICAL THERAPY | Facility: HOSPITAL | Age: 34
End: 2024-02-06

## 2024-02-08 ENCOUNTER — APPOINTMENT (OUTPATIENT)
Dept: PHYSICAL THERAPY | Age: 34
End: 2024-02-08
Attending: FAMILY MEDICINE
Payer: COMMERCIAL

## 2024-02-12 ENCOUNTER — MED REC SCAN ONLY (OUTPATIENT)
Facility: CLINIC | Age: 34
End: 2024-02-12

## 2024-02-13 ENCOUNTER — TELEPHONE (OUTPATIENT)
Dept: PHYSICAL THERAPY | Facility: HOSPITAL | Age: 34
End: 2024-02-13

## 2024-02-15 ENCOUNTER — TELEPHONE (OUTPATIENT)
Dept: PHYSICAL THERAPY | Facility: HOSPITAL | Age: 34
End: 2024-02-15

## 2024-02-20 RX ORDER — LOSARTAN POTASSIUM 100 MG/1
100 TABLET ORAL DAILY
Qty: 90 TABLET | Refills: 1 | Status: SHIPPED | OUTPATIENT
Start: 2024-02-20

## 2024-02-29 ENCOUNTER — APPOINTMENT (OUTPATIENT)
Dept: PHYSICAL THERAPY | Age: 34
End: 2024-02-29
Attending: FAMILY MEDICINE
Payer: COMMERCIAL

## 2024-03-07 ENCOUNTER — OFFICE VISIT (OUTPATIENT)
Dept: PHYSICAL THERAPY | Age: 34
End: 2024-03-07
Attending: FAMILY MEDICINE
Payer: COMMERCIAL

## 2024-03-07 PROCEDURE — 97110 THERAPEUTIC EXERCISES: CPT

## 2024-03-07 NOTE — PROGRESS NOTES
Progress Summary  Pt has attended 5 visits in Physical Therapy.     Diagnosis:   Chronic right shoulder pain (M25.511,G89.29)       Referring Provider: Taya  Date of Evaluation:    1/10/2024    Precautions:  None Next MD visit:   none scheduled  Date of Surgery: n/a   Insurance Primary/Secondary: [employer] [place] HMO / N/A     # Auth Visits: 5 visits 1/1-3/31            Subjective: Patient reports his shoulder has gotten better and he is happy about it.   Functional improvements: reduced pain during triceps training  Functional deficits: pain still lingers, however is at a lower intensity compared to before     Pain: , 0/10 upon arrival      Objective:     Observation/Posture: slight FHP rounded shoulders  Palpation: no TTP in subscap, improvement in lat and pec tension        AROM: (* denotes performed with pain)  Shoulder    Flexion: R 160; L 160  Abduction: R 170; L 170  ER: R T4*; L T3  IR: R T11; L T11      Accessory motion: posterior GH JM improved, inferior WNL     Strength/MMT: (* denotes performed with pain)  Shoulder Elbow Scapular   Flexion: R 5/5; L 5/5  Abduction: R 5/5; L 5/5  ER: R 5/5; L 5/5  IR: R 5/5; L 5/5  Supraspinatus: R 4+/5, L 5/5  Teres Minor: R 5/5, L 5/5  Subscapularis: R 4+/5, L 5/5    Flexion: R 5/5; L 5/5  Extension: R 5/5; L 5/5 Rhomboids: R 4+/5, L 5/5  Mid trap: R 5/5; L 5/5   Low trap: R 4+/5; L 4+/5      Special tests:   Labrum Special Testing:   Biceps Load II: R -, L -     Instability Special Testing:   Apprehension Test: R -, L -  Sulcus Sign: R -, L -     Subacromial Pain Syndrome:  Empty Can test: R -, L -  Painful Arc Sign: R -, L -  External Rotation Resistance: R -, L -  Harrington-Jonathan Impingement Sign: R -, L -  Neer Impingement Test: R +, L -  Palpable Tenderness Infraspinatus and Supraspinatus: R -, L -     Long Head Biceps Tendinopathy:   Speed Test: R +, L -      Assessment: Pt has been seen in skilled PT for 5 visits since his initial evaluation on 1/10/2024.  Objectively, patient has shown improvements in overall shoulder strength, reduced pain, reduced + impingement results, and improved ROM. Pt continues to have limitations in mild recurring pain during triceps training, and throwing activities. Mil Haley will benefit from continued skilled PT to address the remaining listed deficits for return to full, unrestricted work and extracurricular activities.         Goals:   (to be met in 10 visits)   Pt will report improved ability to sleep without waking due to shoulder pain MET  Pt will improve shoulder flexion AROM to >160 degrees to be able to reach into overhead cabinets without pain or restriction MET  Pt will improve shoulder abduction AROM to >170 degrees to improve ability to don deodorant, don/doff shirts, and wash hair MET  Pt will improve shoulder strength throughout to 5/5 to improve function with gym activities  MET  Pt will demonstrate increased mid/low trap strength to 5/5 to promote improved shoulder mechanics and stabilization with lifting and reaching MET  Pt will be independent and compliant with comprehensive HEP to maintain progress achieved in PT Ongoing       QuickDASH Outcome Score  Score: 27.27 % (1/10/2024  8:25 AM)    Post QuickDASH Outcome Score  Post Score: 15.91 % (3/7/2024  6:34 PM)    11.36 % improvement    Plan: Continue skilled Physical Therapy 1 x/week for a total of 10 visits over a 90 day period. Treatment will include: Manual Therapy, Neuromuscular Re-education, Therapeutic Activities, Therapeutic Exercise, Home Exercise Program instruction, and Modalities to include: Electrical stimulation (unattended)        Patient/Family/Caregiver was advised of these findings, precautions, and treatment options and has agreed to actively participate in planning and for this course of care.    Thank you for your referral. If you have any questions, please contact me at Dept: [phone].    Sincerely,  Electronically signed by therapist: Russel  YESSY Smith / Francie Brooke PT    Physician's certification required:  No  Please co-sign or sign and return this letter via fax as soon as possible to [phone].   I certify the need for these services furnished under this plan of treatment and while under my care.    X___________________________________________________ Date____________________    Certification From: 3/7/2024  To:6/5/2024     Frequency / Duration: Patient will be seen for 2 x/week or a total of 10 visits over a 90 day period. Treatment will include: Manual Therapy, Neuromuscular Re-education, Therapeutic Activities, Therapeutic Exercise, Home Exercise Program instruction, and Modalities to include: Electrical stimulation (unattended)   Date: 1/17/2024  TX#: 2/5 Date:  1/25/24               TX#: 3/5 Date: 2/1/24                TX#: 4/5 Date:  3/7/24               TX#: 5/5 Date:   Tx#: 6/   Manual: 5 min  STM: posterior cuff  JM: posterior and inferior R GH  GR III/IV   Manual: 5 min  STM: posterior cuff  JM: posterior and inferior R GH  GR III/IV      Ther-Ex: 30 min  UBE 2'/2'   R shoulder PROM  Manual posterior cuff stretch supine, 2x30\"   Modified sleeper stretch, 2x30\"   Sidelying ER, 3#, 2x15  Sidelying HABD, 3#, 2x15  TRX row, 2x10  TRX pec stretch, 3 way, 1x30\" ea   Resisted HADD, 20#, 2x10  Standing abduction, 8#, ecc lower with scap squeeze, 2x10  Resisted HABD, green TB, 2x10  Resisted ER bilat, green TB, 2x10   Open book at wall, 1x15 B      Ther-Ex: 30 min  UBE 2'/2'   Modified sleeper stretch 2 x 30\"  HABD at wall BTB 2 x 15  Low trap setting YTB loop 2 x 10  ER isometric walkouts pink tubing 2 x 10  ER isometric chest press pink tubing 2 x 10  ER isometric OHP YTB loop 2 x 10  YTB flutters to overhead x 10  Single arm chest press on shuttle w/ RSB 6lb 2 x 15     Ther-Ex: 45 min  UBE 2'/2'   B/L ER RTB 2 x 20  Single arm doorway pec stretch at 90 abd/at side 3 x 20 sec  Wall slide lat stretch w/ towel 10 x 5 sec  ER isometric  chest press RTB loop 2 x 15  HABD to chest w/ BTB 2 x 15  Bent over Y raises 1lb 2 x 15  Single arm ER at 90 deg abd red tubing 2 x 15  Body blade shakes punches/horizontal abd+add/flex+ext x 1min each Ther-Ex: 45 min  UBE 2'/2'   Reassessment  TRX row 2 x 12  RTB flutters to overhead x 10  Wall clock RTB loop 2 x 5  Upside down KB hold walks 13lb 3 x 40'      NMR: 10 min  RS: 60, 90, 120 deg of 30\" ea  Supine serratus press, 5#, 2x10  Scap clock, red TB, 1'   Serratus wall slide, 2x10              HEP: Access Code: [email or web address]  URL: [email or web address]  Date: 02/01/2024  Prepared by: Russel Smith    Exercises  - Shoulder External Rotation with Anchored Resistance  - 1 x daily - 7 x weekly - 3 sets - 10 reps - green band  - Shoulder Internal Rotation with Resistance  - 1 x daily - 7 x weekly - 3 sets - 10 reps - green band  - Shoulder Extension with Resistance - Palms Forward  - 1 x daily - 4 x weekly - 3 sets - 10 reps - green band  - Standing Single Arm Shoulder External Rotation in Abduction with Anchored Resistance  - 2 x daily - 7 x weekly - 2 sets - 10 reps - 3 hold  - Shoulder External Rotation and Scapular Retraction with Resistance  - 2 x daily - 7 x weekly - 2 sets - 10 reps - 3 hold  - Single Arm Scaption with Resistance  - 1 x daily - 7 x weekly - 3 sets - 10 reps - green band  - Shoulder Flexion Serratus Activation with Resistance  - 2 x daily - 7 x weekly - 2 sets - 10 reps - 3 hold  - Doorway Pec Stretch at 60 Elevation  - 1 x daily - 7 x weekly - 2 sets - 30\" hold  - Standing Posterior Cuff Stretch  - 1 x daily - 7 x weekly - 2 sets - 30\" hold    Charges:3 TE         Total Timed Treatment: 45 min  Total Treatment Time: 45 min

## 2024-03-14 ENCOUNTER — OFFICE VISIT (OUTPATIENT)
Dept: PHYSICAL THERAPY | Age: 34
End: 2024-03-14
Attending: FAMILY MEDICINE
Payer: COMMERCIAL

## 2024-03-14 PROCEDURE — 97110 THERAPEUTIC EXERCISES: CPT

## 2024-03-14 NOTE — PROGRESS NOTES
Diagnosis:   Chronic right shoulder pain (M25.511,G89.29)       Referring Provider: Taya  Date of Evaluation:    1/10/2024    Precautions:  None Next MD visit:   none scheduled  Date of Surgery: n/a   Insurance Primary/Secondary: [employer] [place] HMO / N/A     # Auth Visits: 5 visits 1/1-3/31            Subjective: Patient reports feeling well, he has been ok with his workouts so far    Pain: , 0/10 upon arrival      Objective:     Improving shoulder stability       Assessment: Patient able to demonstrate good improvements in shoulder strength and stability. Progressed to throwing endurance drills for time with good tolerance.          Goals:   (to be met in 10 visits)   Pt will report improved ability to sleep without waking due to shoulder pain MET  Pt will improve shoulder flexion AROM to >160 degrees to be able to reach into overhead cabinets without pain or restriction MET  Pt will improve shoulder abduction AROM to >170 degrees to improve ability to don deodorant, don/doff shirts, and wash hair MET  Pt will improve shoulder strength throughout to 5/5 to improve function with gym activities  MET  Pt will demonstrate increased mid/low trap strength to 5/5 to promote improved shoulder mechanics and stabilization with lifting and reaching MET  Pt will be independent and compliant with comprehensive HEP to maintain progress achieved in PT Ongoing         Plan: Progress overhead shoulder stability for throwing as per tolerance    Frequency / Duration: Patient will be seen for 2 x/week or a total of 10 visits over a 90 day period. Treatment will include: Manual Therapy, Neuromuscular Re-education, Therapeutic Activities, Therapeutic Exercise, Home Exercise Program instruction, and Modalities to include: Electrical stimulation (unattended)   Date: 1/17/2024  TX#: 2/5 Date:  1/25/24               TX#: 3/5 Date: 2/1/24                TX#: 4/5 Date:  3/7/24               TX#: 5/5 Date: 3/14/24  Tx#: 6/10   Manual: 5  min  STM: posterior cuff  JM: posterior and inferior R GH  GR III/IV   Manual: 5 min  STM: posterior cuff  JM: posterior and inferior R GH  GR III/IV      Ther-Ex: 30 min  UBE 2'/2'   R shoulder PROM  Manual posterior cuff stretch supine, 2x30\"   Modified sleeper stretch, 2x30\"   Sidelying ER, 3#, 2x15  Sidelying HABD, 3#, 2x15  TRX row, 2x10  TRX pec stretch, 3 way, 1x30\" ea   Resisted HADD, 20#, 2x10  Standing abduction, 8#, ecc lower with scap squeeze, 2x10  Resisted HABD, green TB, 2x10  Resisted ER bilat, green TB, 2x10   Open book at wall, 1x15 B      Ther-Ex: 30 min  UBE 2'/2'   Modified sleeper stretch 2 x 30\"  HABD at wall BTB 2 x 15  Low trap setting YTB loop 2 x 10  ER isometric walkouts pink tubing 2 x 10  ER isometric chest press pink tubing 2 x 10  ER isometric OHP YTB loop 2 x 10  YTB flutters to overhead x 10  Single arm chest press on shuttle w/ RSB 6lb 2 x 15     Ther-Ex: 45 min  UBE 2'/2'   B/L ER RTB 2 x 20  Single arm doorway pec stretch at 90 abd/at side 3 x 20 sec  Wall slide lat stretch w/ towel 10 x 5 sec  ER isometric chest press RTB loop 2 x 15  HABD to chest w/ BTB 2 x 15  Bent over Y raises 1lb 2 x 15  Single arm ER at 90 deg abd red tubing 2 x 15  Body blade shakes punches/horizontal abd+add/flex+ext x 1min each Ther-Ex: 45 min  UBE 2'/2'   Reassessment  TRX row 2 x 12  RTB flutters to overhead x 10  Wall clock RTB loop 2 x 5  Upside down KB hold walks 13lb 3 x 40'   Ther-Ex: 45 min  UBE 2'/2'   Face pull BTB 3 x 10  Ball throwing at 90 deg shoulder elevation (adduction/IR) 2.2lb ball 3 x 30 sec  Low trap setting GTB loop 3 x 10  Prone on GSB throws back to catch 3lb sand ball 3 x 30 sec  Toss to rebounder red ball x 1min (intermittent but inconsistent mild pain)   13lb KB overhead walk 2 x length of turf  9lb KB OHP 2 x 10     NMR: 10 min  RS: 60, 90, 120 deg of 30\" ea  Supine serratus press, 5#, 2x10  Scap clock, red TB, 1'   Serratus wall slide, 2x10              HEP: Access  Code: [email or web address]  URL: [email or web address]  Date: 02/01/2024  Prepared by: Russel Smith    Exercises  - Shoulder External Rotation with Anchored Resistance  - 1 x daily - 7 x weekly - 3 sets - 10 reps - green band  - Shoulder Internal Rotation with Resistance  - 1 x daily - 7 x weekly - 3 sets - 10 reps - green band  - Shoulder Extension with Resistance - Palms Forward  - 1 x daily - 4 x weekly - 3 sets - 10 reps - green band  - Standing Single Arm Shoulder External Rotation in Abduction with Anchored Resistance  - 2 x daily - 7 x weekly - 2 sets - 10 reps - 3 hold  - Shoulder External Rotation and Scapular Retraction with Resistance  - 2 x daily - 7 x weekly - 2 sets - 10 reps - 3 hold  - Single Arm Scaption with Resistance  - 1 x daily - 7 x weekly - 3 sets - 10 reps - green band  - Shoulder Flexion Serratus Activation with Resistance  - 2 x daily - 7 x weekly - 2 sets - 10 reps - 3 hold  - Doorway Pec Stretch at 60 Elevation  - 1 x daily - 7 x weekly - 2 sets - 30\" hold  - Standing Posterior Cuff Stretch  - 1 x daily - 7 x weekly - 2 sets - 30\" hold    Charges:3 TE         Total Timed Treatment: 45 min  Total Treatment Time: 45 min

## 2024-03-17 RX ORDER — HYDROCHLOROTHIAZIDE 25 MG/1
25 TABLET ORAL DAILY
Qty: 90 TABLET | Refills: 0 | Status: SHIPPED | OUTPATIENT
Start: 2024-03-17

## 2024-03-21 ENCOUNTER — OFFICE VISIT (OUTPATIENT)
Dept: PHYSICAL THERAPY | Age: 34
End: 2024-03-21
Attending: FAMILY MEDICINE
Payer: COMMERCIAL

## 2024-03-21 PROCEDURE — 97110 THERAPEUTIC EXERCISES: CPT

## 2024-03-21 NOTE — PROGRESS NOTES
Diagnosis:   Chronic right shoulder pain (M25.511,G89.29)       Referring Provider: Taya  Date of Evaluation:    1/10/2024    Precautions:  None Next MD visit:   none scheduled  Date of Surgery: n/a   Insurance Primary/Secondary: [employer] [place] HMO / N/A     # Auth Visits: 5 visits 1/1-3/31            Subjective: Patient reports feeling well, shoulder doesn't bother him much anymore but his knee is starting to now.     Pain: , 0/10 upon arrival      Objective:     Improving shoulder stability       Assessment: Patient demonstrating continued improvements in dynamic shoulder stability and endurance. Progressed to higher           Goals:   (to be met in 10 visits)   Pt will report improved ability to sleep without waking due to shoulder pain MET  Pt will improve shoulder flexion AROM to >160 degrees to be able to reach into overhead cabinets without pain or restriction MET  Pt will improve shoulder abduction AROM to >170 degrees to improve ability to don deodorant, don/doff shirts, and wash hair MET  Pt will improve shoulder strength throughout to 5/5 to improve function with gym activities  MET  Pt will demonstrate increased mid/low trap strength to 5/5 to promote improved shoulder mechanics and stabilization with lifting and reaching MET  Pt will be independent and compliant with comprehensive HEP to maintain progress achieved in PT Ongoing         Plan: Progress overhead shoulder stability for throwing as per tolerance    Frequency / Duration: Patient will be seen for 2 x/week or a total of 10 visits over a 90 day period. Treatment will include: Manual Therapy, Neuromuscular Re-education, Therapeutic Activities, Therapeutic Exercise, Home Exercise Program instruction, and Modalities to include: Electrical stimulation (unattended)   Date:  1/25/24               TX#: 3/5 Date: 2/1/24                TX#: 4/5 Date:  3/7/24               TX#: 5/5 Date: 3/14/24  Tx#: 6/10 Date: 3/21/24  Tx# 7/10   Manual: 5 min  STM:  posterior cuff  JM: posterior and inferior R GH JM GR III/IV       Ther-Ex: 30 min  UBE 2'/2'   Modified sleeper stretch 2 x 30\"  HABD at wall BTB 2 x 15  Low trap setting YTB loop 2 x 10  ER isometric walkouts pink tubing 2 x 10  ER isometric chest press pink tubing 2 x 10  ER isometric OHP YTB loop 2 x 10  YTB flutters to overhead x 10  Single arm chest press on shuttle w/ RSB 6lb 2 x 15     Ther-Ex: 45 min  UBE 2'/2'   B/L ER RTB 2 x 20  Single arm doorway pec stretch at 90 abd/at side 3 x 20 sec  Wall slide lat stretch w/ towel 10 x 5 sec  ER isometric chest press RTB loop 2 x 15  HABD to chest w/ BTB 2 x 15  Bent over Y raises 1lb 2 x 15  Single arm ER at 90 deg abd red tubing 2 x 15  Body blade shakes punches/horizontal abd+add/flex+ext x 1min each Ther-Ex: 45 min  UBE 2'/2'   Reassessment  TRX row 2 x 12  RTB flutters to overhead x 10  Wall clock RTB loop 2 x 5  Upside down KB hold walks 13lb 3 x 40'   Ther-Ex: 45 min  UBE 2'/2'   Face pull BTB 3 x 10  Ball throwing at 90 deg shoulder elevation (adduction/IR) 2.2lb ball 3 x 30 sec  Low trap setting GTB loop 3 x 10  Prone on GSB throws back to catch 3lb sand ball 3 x 30 sec  Toss to rebounder red ball x 1min (intermittent but inconsistent mild pain)   13lb KB overhead walk 2 x length of turf  9lb KB OHP 2 x 10   Ther-Ex: 45 min  UBE 2'/2'   TRX row 3 x 10  Ball throwing at 90 deg shoulder elevation (adduction/IR) 3.3lb ball 2 x 1 min  Flutters to overhead GTB loop 2 x 10  ER tosses w/ therapist 1.1lb ball 2 x 10  Scap pushups from knee 2 x 10  Prone on GSB throw back to catch (greater ROM) 1.1lb/2.2lb ball    Upside down KB overhead walk x 1 lap                   HEP: Access Code: [email or web address]  URL: [email or web address]  Date: 02/01/2024  Prepared by: Russel Smith    Exercises  - Shoulder External Rotation with Anchored Resistance  - 1 x daily - 7 x weekly - 3 sets - 10 reps - green band  - Shoulder Internal Rotation with Resistance  - 1 x daily - 7 x  weekly - 3 sets - 10 reps - green band  - Shoulder Extension with Resistance - Palms Forward  - 1 x daily - 4 x weekly - 3 sets - 10 reps - green band  - Standing Single Arm Shoulder External Rotation in Abduction with Anchored Resistance  - 2 x daily - 7 x weekly - 2 sets - 10 reps - 3 hold  - Shoulder External Rotation and Scapular Retraction with Resistance  - 2 x daily - 7 x weekly - 2 sets - 10 reps - 3 hold  - Single Arm Scaption with Resistance  - 1 x daily - 7 x weekly - 3 sets - 10 reps - green band  - Shoulder Flexion Serratus Activation with Resistance  - 2 x daily - 7 x weekly - 2 sets - 10 reps - 3 hold  - Doorway Pec Stretch at 60 Elevation  - 1 x daily - 7 x weekly - 2 sets - 30\" hold  - Standing Posterior Cuff Stretch  - 1 x daily - 7 x weekly - 2 sets - 30\" hold    Charges:3 TE         Total Timed Treatment: 45 min  Total Treatment Time: 45 min

## 2024-03-28 ENCOUNTER — APPOINTMENT (OUTPATIENT)
Dept: PHYSICAL THERAPY | Age: 34
End: 2024-03-28
Attending: FAMILY MEDICINE
Payer: COMMERCIAL

## 2024-04-04 ENCOUNTER — OFFICE VISIT (OUTPATIENT)
Dept: PHYSICAL THERAPY | Age: 34
End: 2024-04-04
Attending: FAMILY MEDICINE
Payer: COMMERCIAL

## 2024-04-04 PROCEDURE — 97110 THERAPEUTIC EXERCISES: CPT

## 2024-04-04 NOTE — PROGRESS NOTES
Diagnosis:   Chronic right shoulder pain (M25.511,G89.29)       Referring Provider: Taya  Date of Evaluation:    1/10/2024    Precautions:  None Next MD visit:   none scheduled  Date of Surgery: n/a   Insurance Primary/Secondary: [employer] [place] HMO / N/A     # Auth Visits: 5 visits 1/1-3/31            Subjective: Patient reports he was doing ok but he did mention he had some pain after his arm slipped closing the tail gate of his car the other day.      Pain:    0/10 upon arrival      Objective:     Improving shoulder stability w/ heavier weight throwing activities      Assessment: Patient demonstrating continued improvements in dynamic shoulder stability and endurance. Progressed w/ pushups on bosu ball and pushup plank blaze pods taps w/ good tolerance. Attempted throwing w/ 3lb ball with no pain for the first time.         Goals:   (to be met in 10 visits)   Pt will report improved ability to sleep without waking due to shoulder pain MET  Pt will improve shoulder flexion AROM to >160 degrees to be able to reach into overhead cabinets without pain or restriction MET  Pt will improve shoulder abduction AROM to >170 degrees to improve ability to don deodorant, don/doff shirts, and wash hair MET  Pt will improve shoulder strength throughout to 5/5 to improve function with gym activities  MET  Pt will demonstrate increased mid/low trap strength to 5/5 to promote improved shoulder mechanics and stabilization with lifting and reaching MET  Pt will be independent and compliant with comprehensive HEP to maintain progress achieved in PT Ongoing         Plan: Progress overhead shoulder stability for throwing as per tolerance    Frequency / Duration: Patient will be seen for 2 x/week or a total of 10 visits over a 90 day period. Treatment will include: Manual Therapy, Neuromuscular Re-education, Therapeutic Activities, Therapeutic Exercise, Home Exercise Program instruction, and Modalities to include: Electrical stimulation  (unattended)   Date: 2/1/24                TX#: 4/5 Date:  3/7/24               TX#: 5/5 Date: 3/14/24  Tx#: 6/10 Date: 3/21/24  Tx# 7/10 Date: 4/4/24  Tx# 8/10          Ther-Ex: 45 min  UBE 2'/2'   B/L ER RTB 2 x 20  Single arm doorway pec stretch at 90 abd/at side 3 x 20 sec  Wall slide lat stretch w/ towel 10 x 5 sec  ER isometric chest press RTB loop 2 x 15  HABD to chest w/ BTB 2 x 15  Bent over Y raises 1lb 2 x 15  Single arm ER at 90 deg abd red tubing 2 x 15  Body blade shakes punches/horizontal abd+add/flex+ext x 1min each Ther-Ex: 45 min  UBE 2'/2'   Reassessment  TRX row 2 x 12  RTB flutters to overhead x 10  Wall clock RTB loop 2 x 5  Upside down KB hold walks 13lb 3 x 40'   Ther-Ex: 45 min  UBE 2'/2'   Face pull BTB 3 x 10  Ball throwing at 90 deg shoulder elevation (adduction/IR) 2.2lb ball 3 x 30 sec  Low trap setting GTB loop 3 x 10  Prone on GSB throws back to catch 3lb sand ball 3 x 30 sec  Toss to rebounder red ball x 1min (intermittent but inconsistent mild pain)   13lb KB overhead walk 2 x length of turf  9lb KB OHP 2 x 10   Ther-Ex: 45 min  UBE 2'/2'   TRX row 3 x 10  Ball throwing at 90 deg shoulder elevation (adduction/IR) 3.3lb ball 2 x 1 min  Flutters to overhead GTB loop 2 x 10  ER tosses w/ therapist 1.1lb ball 2 x 10  Scap pushups from knee 2 x 10  Prone on GSB throw back to catch (greater ROM) 1.1lb/2.2lb ball  x 1 min each   Upside down KB overhead walk x 1 lap   Ther-Ex: 45 min  UBE 2'/2'   Row to ER to OHP blue tubing 3 x 10  Flutters GTB loop 2 x 10  Ball throwing at 90 deg shoulder elevation (adduction/IR) 4.4lb ball 2 x 1 min  ER tosses w/ therapist 2.2lb ball 2 x 10  Push up on bosu ball 3 x 7  Plank blaze pods taps x 3 min  3lb ball toss to rebound x 20                 HEP: Access Code: [email or web address]  URL: [email or web address]  Date: 02/01/2024  Prepared by: Russel Smith    Exercises  - Shoulder External Rotation with Anchored Resistance  - 1 x daily - 7 x weekly - 3 sets -  10 reps - green band  - Shoulder Internal Rotation with Resistance  - 1 x daily - 7 x weekly - 3 sets - 10 reps - green band  - Shoulder Extension with Resistance - Palms Forward  - 1 x daily - 4 x weekly - 3 sets - 10 reps - green band  - Standing Single Arm Shoulder External Rotation in Abduction with Anchored Resistance  - 2 x daily - 7 x weekly - 2 sets - 10 reps - 3 hold  - Shoulder External Rotation and Scapular Retraction with Resistance  - 2 x daily - 7 x weekly - 2 sets - 10 reps - 3 hold  - Single Arm Scaption with Resistance  - 1 x daily - 7 x weekly - 3 sets - 10 reps - green band  - Shoulder Flexion Serratus Activation with Resistance  - 2 x daily - 7 x weekly - 2 sets - 10 reps - 3 hold  - Doorway Pec Stretch at 60 Elevation  - 1 x daily - 7 x weekly - 2 sets - 30\" hold  - Standing Posterior Cuff Stretch  - 1 x daily - 7 x weekly - 2 sets - 30\" hold    Charges:3 TE         Total Timed Treatment: 45 min  Total Treatment Time: 45 min

## 2024-04-11 ENCOUNTER — OFFICE VISIT (OUTPATIENT)
Dept: PHYSICAL THERAPY | Age: 34
End: 2024-04-11
Attending: FAMILY MEDICINE
Payer: COMMERCIAL

## 2024-04-11 PROCEDURE — 97110 THERAPEUTIC EXERCISES: CPT

## 2024-04-11 NOTE — PROGRESS NOTES
Diagnosis:   Chronic right shoulder pain (M25.511,G89.29)       Referring Provider: Taya  Date of Evaluation:    1/10/2024    Precautions:  None Next MD visit:   none scheduled  Date of Surgery: n/a   Insurance Primary/Secondary: [employer] [place] HMO / N/A     # Auth Visits: 5 visits 1/1-3/31            Subjective: Patient reports his shoulder has been doing well, no pain or discomfort as of late    Pain:    0/10 upon arrival      Objective:     Improving shoulder stability w/ heavier weight throwing activities      Assessment: Patient demonstrating continued improvements in shoulder strength and endurance. Was progressed w/ additional laps for upside down KB overhead carry and added resistance for overhead throws to rebounder.         Goals:   (to be met in 10 visits)   Pt will report improved ability to sleep without waking due to shoulder pain MET  Pt will improve shoulder flexion AROM to >160 degrees to be able to reach into overhead cabinets without pain or restriction MET  Pt will improve shoulder abduction AROM to >170 degrees to improve ability to don deodorant, don/doff shirts, and wash hair MET  Pt will improve shoulder strength throughout to 5/5 to improve function with gym activities  MET  Pt will demonstrate increased mid/low trap strength to 5/5 to promote improved shoulder mechanics and stabilization with lifting and reaching MET  Pt will be independent and compliant with comprehensive HEP to maintain progress achieved in PT Ongoing         Plan: D/C w/ updated HEP    Frequency / Duration: Patient will be seen for 2 x/week or a total of 10 visits over a 90 day period. Treatment will include: Manual Therapy, Neuromuscular Re-education, Therapeutic Activities, Therapeutic Exercise, Home Exercise Program instruction, and Modalities to include: Electrical stimulation (unattended)   Date: 3/14/24  Tx#: 6/10 Date: 3/21/24  Tx# 7/10 Date: 4/4/24  Tx# 8/10 Date: 4/11/24  Tx# 9/10 Date: 4/25/24  Tx# 10/10  PN for  Please CALL patient and give her the CT results verbally, because she complained she die not understand the email   Referral to dermatology signed   Not to worry   d/c          Ther-Ex: 45 min  UBE 2'/2'   Face pull BTB 3 x 10  Ball throwing at 90 deg shoulder elevation (adduction/IR) 2.2lb ball 3 x 30 sec  Low trap setting GTB loop 3 x 10  Prone on GSB throws back to catch 3lb sand ball 3 x 30 sec  Toss to rebounder red ball x 1min (intermittent but inconsistent mild pain)   13lb KB overhead walk 2 x length of turf  9lb KB OHP 2 x 10   Ther-Ex: 45 min  UBE 2'/2'   TRX row 3 x 10  Ball throwing at 90 deg shoulder elevation (adduction/IR) 3.3lb ball 2 x 1 min  Flutters to overhead GTB loop 2 x 10  ER tosses w/ therapist 1.1lb ball 2 x 10  Scap pushups from knee 2 x 10  Prone on GSB throw back to catch (greater ROM) 1.1lb/2.2lb ball  x 1 min each   Upside down KB overhead walk x 1 lap   Ther-Ex: 45 min  UBE 2'/2'   Row to ER to OHP blue tubing 3 x 10  Flutters GTB loop 2 x 10  Ball throwing at 90 deg shoulder elevation (adduction/IR) 4.4lb ball 2 x 1 min  ER tosses w/ therapist 2.2lb ball 2 x 10  Push up on bosu ball 3 x 7  Plank blaze pods taps x 3 min  3lb ball toss to rebound x 20 Ther-Ex: 45 min  UBE 2'/2' lvl 11  Row to ER to OHP green tubing 3 x 10  TRX push ups 3 x AMRAP  ER tosses w/ therapist 2.2lb ball 2 x 10  Flat bench upside down KB press 18lb/26lb 3 x 10  Blue CLX resisted throws to rebounder 2 x 20 red/yellow ball  Upside down KB overhead walk 18lb x 3 laps                  HEP: Access Code: [email or web address]  URL: [email or web address]  Date: 02/01/2024  Prepared by: Russel Smith    Exercises  - Shoulder External Rotation with Anchored Resistance  - 1 x daily - 7 x weekly - 3 sets - 10 reps - green band  - Shoulder Internal Rotation with Resistance  - 1 x daily - 7 x weekly - 3 sets - 10 reps - green band  - Shoulder Extension with Resistance - Palms Forward  - 1 x daily - 4 x weekly - 3 sets - 10 reps - green band  - Standing Single Arm Shoulder External Rotation in Abduction with Anchored Resistance  - 2 x daily - 7 x weekly - 2 sets - 10 reps - 3 hold  -  Shoulder External Rotation and Scapular Retraction with Resistance  - 2 x daily - 7 x weekly - 2 sets - 10 reps - 3 hold  - Single Arm Scaption with Resistance  - 1 x daily - 7 x weekly - 3 sets - 10 reps - green band  - Shoulder Flexion Serratus Activation with Resistance  - 2 x daily - 7 x weekly - 2 sets - 10 reps - 3 hold  - Doorway Pec Stretch at 60 Elevation  - 1 x daily - 7 x weekly - 2 sets - 30\" hold  - Standing Posterior Cuff Stretch  - 1 x daily - 7 x weekly - 2 sets - 30\" hold    Charges:3 TE         Total Timed Treatment: 45 min  Total Treatment Time: 45 min

## 2024-04-25 ENCOUNTER — OFFICE VISIT (OUTPATIENT)
Dept: PHYSICAL THERAPY | Age: 34
End: 2024-04-25
Attending: FAMILY MEDICINE
Payer: COMMERCIAL

## 2024-04-25 PROCEDURE — 97110 THERAPEUTIC EXERCISES: CPT

## 2024-04-25 NOTE — PROGRESS NOTES
Discharge Summary  Pt has attended 10 visits in Physical Therapy.   Diagnosis:   Chronic right shoulder pain (M25.511,G89.29)       Referring Provider: Taya  Date of Evaluation:    1/10/2024    Precautions:  None Next MD visit:   none scheduled  Date of Surgery: n/a   Insurance Primary/Secondary: [employer] HMO / N/A     # Auth Visits: 5 visits 1/1-3/31            Subjective: Patient reports he feels generally well, some aches here and there but definitely manageable.   Functional improvements: workouts have been easier, tricep exercises are much better with minimal pain  Functional deficits: patient able to throw with minimal issues    Pain:    0/10 upon arrival      Objective:     Observation/Posture: slight FHP rounded shoulders  Palpation: no TTP in subscap, improvement in lat and pec tension        AROM: (* denotes performed with pain)  Shoulder    Flexion: R 160; L 160  Abduction: R 170; L 170  ER: R T5*; L T4  IR: R T11; L T11      Accessory motion: posterior GH JM improved, inferior WNL     Strength/MMT: (* denotes performed with pain)  Shoulder Elbow Scapular   Flexion: R 5/5; L 5/5  Abduction: R 5/5; L 5/5  ER: R 5/5; L 5/5  IR: R 5/5; L 5/5  Supraspinatus: R 5/5, L 5/5  Teres Minor: R 5/5, L 5/5  Subscapularis: R 5/5, L 5/5    Flexion: R 5/5; L 5/5  Extension: R 5/5; L 5/5 Rhomboids: R 5/5, L 5/5  Mid trap: R 5/5; L 5/5   Low trap: R 5/5; L 4+/5      Special tests:   Labrum Special Testing:   Biceps Load II: R -, L -     Instability Special Testing:   Apprehension Test: R -, L -  Sulcus Sign: R -, L -     Subacromial Pain Syndrome:  Empty Can test: R -, L -  Painful Arc Sign: R -, L -  External Rotation Resistance: R -, L -  Harrington-Jonathan Impingement Sign: R -, L -  Neer Impingement Test: R -, L -  Palpable Tenderness Infraspinatus and Supraspinatus: R -, L -     Long Head Biceps Tendinopathy:   Speed Test: R -, L -      Assessment: Pt has been seen in skilled PT for 10 visits since his initial  evaluation on 1/10/24. Objectively, patient has shown improvements in now testing negative on all impingement tests, improved UE strength, and reduced pain during ADLs. Mil Haley has met all goals and is set to be d/c with his updated HEP to continue progressing at home.           Goals:   (to be met in 10 visits)   Pt will report improved ability to sleep without waking due to shoulder pain MET  Pt will improve shoulder flexion AROM to >160 degrees to be able to reach into overhead cabinets without pain or restriction MET  Pt will improve shoulder abduction AROM to >170 degrees to improve ability to don deodorant, don/doff shirts, and wash hair MET  Pt will improve shoulder strength throughout to 5/5 to improve function with gym activities  MET  Pt will demonstrate increased mid/low trap strength to 5/5 to promote improved shoulder mechanics and stabilization with lifting and reaching MET  Pt will be independent and compliant with comprehensive HEP to maintain progress achieved in PT Ongoing       QuickDASH Outcome Score  Score: 27.27 % (1/10/2024  8:25 AM)    Post QuickDASH Outcome Score  Post Score: 2.27 % (4/25/2024  6:25 PM)    25 % improvement    Plan: D/C w/ updated HEP    Patient/Family/Caregiver was advised of these findings, precautions, and treatment options and has agreed to actively participate in planning and for this course of care.    Thank you for your referral. If you have any questions, please contact me at Dept: [phone].    Sincerely,  Electronically signed by therapist: Russel Smith PTA     Physician's certification required:  No  Please co-sign or sign and return this letter via fax as soon as possible to [phone].   I certify the need for these services furnished under this plan of treatment and while under my care.    X___________________________________________________ Date____________________    Certification From: 4/25/2024  To:7/24/2024       Frequency / Duration: Patient  will be seen for 2 x/week or a total of 10 visits over a 90 day period. Treatment will include: Manual Therapy, Neuromuscular Re-education, Therapeutic Activities, Therapeutic Exercise, Home Exercise Program instruction, and Modalities to include: Electrical stimulation (unattended)   Date: 3/14/24  Tx#: 6/10 Date: 3/21/24  Tx# 7/10 Date: 4/4/24  Tx# 8/10 Date: 4/11/24  Tx# 9/10 Date: 4/25/24  Tx# 10/10  PN for d/c   Ther-Ex: 45 min  UBE 2'/2'   Face pull BTB 3 x 10  Ball throwing at 90 deg shoulder elevation (adduction/IR) 2.2lb ball 3 x 30 sec  Low trap setting GTB loop 3 x 10  Prone on GSB throws back to catch 3lb sand ball 3 x 30 sec  Toss to rebounder red ball x 1min (intermittent but inconsistent mild pain)   13lb KB overhead walk 2 x length of turf  9lb KB OHP 2 x 10   Ther-Ex: 45 min  UBE 2'/2'   TRX row 3 x 10  Ball throwing at 90 deg shoulder elevation (adduction/IR) 3.3lb ball 2 x 1 min  Flutters to overhead GTB loop 2 x 10  ER tosses w/ therapist 1.1lb ball 2 x 10  Scap pushups from knee 2 x 10  Prone on GSB throw back to catch (greater ROM) 1.1lb/2.2lb ball  x 1 min each   Upside down KB overhead walk x 1 lap   Ther-Ex: 45 min  UBE 2'/2'   Row to ER to OHP blue tubing 3 x 10  Flutters GTB loop 2 x 10  Ball throwing at 90 deg shoulder elevation (adduction/IR) 4.4lb ball 2 x 1 min  ER tosses w/ therapist 2.2lb ball 2 x 10  Push up on bosu ball 3 x 7  Plank blaze pods taps x 3 min  3lb ball toss to rebound x 20 Ther-Ex: 45 min  UBE 2'/2' lvl 11  Row to ER to OHP green tubing 3 x 10  TRX push ups 3 x AMRAP  ER tosses w/ therapist 2.2lb ball 2 x 10  Flat bench upside down KB press 18lb/26lb 3 x 10  Blue CLX resisted throws to rebounder 2 x 20 red/yellow ball  Upside down KB overhead walk 18lb x 3 laps Ther-Ex: 45 min  UBE 2'/2' lvl 11  Reassessment  HEP update and review  Blaze pods plank touches 3 x 30 sec  TRX pushups 3 x 10  Blue CLX face pull to OHP 3 x 10  Alphabet blue CLX x 1 round  2kg throwing to  rebounder x 30 tosses                 HEP: Access Code: [email or web address]  URL: [email or web address]  Date: 02/01/2024  Prepared by: Russel Smith    Exercises  - Shoulder External Rotation with Anchored Resistance  - 1 x daily - 7 x weekly - 3 sets - 10 reps - green band  - Shoulder Internal Rotation with Resistance  - 1 x daily - 7 x weekly - 3 sets - 10 reps - green band  - Shoulder Extension with Resistance - Palms Forward  - 1 x daily - 4 x weekly - 3 sets - 10 reps - green band  - Standing Single Arm Shoulder External Rotation in Abduction with Anchored Resistance  - 2 x daily - 7 x weekly - 2 sets - 10 reps - 3 hold  - Shoulder External Rotation and Scapular Retraction with Resistance  - 2 x daily - 7 x weekly - 2 sets - 10 reps - 3 hold  - Single Arm Scaption with Resistance  - 1 x daily - 7 x weekly - 3 sets - 10 reps - green band  - Shoulder Flexion Serratus Activation with Resistance  - 2 x daily - 7 x weekly - 2 sets - 10 reps - 3 hold  - Doorway Pec Stretch at 60 Elevation  - 1 x daily - 7 x weekly - 2 sets - 30\" hold  - Standing Posterior Cuff Stretch  - 1 x daily - 7 x weekly - 2 sets - 30\" hold    Charges:3 TE         Total Timed Treatment: 45 min  Total Treatment Time: 45 min

## 2024-06-13 RX ORDER — HYDROCHLOROTHIAZIDE 25 MG/1
25 TABLET ORAL DAILY
Qty: 90 TABLET | Refills: 0 | Status: SHIPPED | OUTPATIENT
Start: 2024-06-13

## 2024-08-11 RX ORDER — LOSARTAN POTASSIUM 100 MG/1
100 TABLET ORAL DAILY
Qty: 90 TABLET | Refills: 1 | Status: SHIPPED | OUTPATIENT
Start: 2024-08-11

## 2024-09-08 RX ORDER — HYDROCHLOROTHIAZIDE 25 MG/1
25 TABLET ORAL DAILY
Qty: 90 TABLET | Refills: 0 | Status: SHIPPED | OUTPATIENT
Start: 2024-09-08

## 2024-10-15 ENCOUNTER — TELEPHONE (OUTPATIENT)
Dept: FAMILY MEDICINE CLINIC | Facility: CLINIC | Age: 34
End: 2024-10-15

## 2024-10-15 DIAGNOSIS — E11.65 TYPE 2 DIABETES MELLITUS WITH HYPERGLYCEMIA, WITHOUT LONG-TERM CURRENT USE OF INSULIN (HCC): Primary | ICD-10-CM

## 2024-10-15 DIAGNOSIS — Z00.00 LABORATORY EXAMINATION ORDERED AS PART OF A ROUTINE GENERAL MEDICAL EXAMINATION: ICD-10-CM

## 2024-10-15 NOTE — TELEPHONE ENCOUNTER
Patient is overdue for dm eye exam. Referral given to patient LOV in 11/2023.    Labs ordered ahead of time    MCM sent to pt.

## 2024-10-22 RX ORDER — AMLODIPINE BESYLATE 10 MG/1
10 TABLET ORAL DAILY
Qty: 90 TABLET | Refills: 1 | Status: SHIPPED | OUTPATIENT
Start: 2024-10-22

## 2024-10-30 ENCOUNTER — TELEPHONE (OUTPATIENT)
Dept: FAMILY MEDICINE CLINIC | Facility: CLINIC | Age: 34
End: 2024-10-30

## 2024-10-30 DIAGNOSIS — E11.65 TYPE 2 DIABETES MELLITUS WITH HYPERGLYCEMIA, WITHOUT LONG-TERM CURRENT USE OF INSULIN (HCC): Primary | ICD-10-CM

## 2024-11-02 ENCOUNTER — IMMUNIZATION (OUTPATIENT)
Dept: LAB | Age: 34
End: 2024-11-02
Attending: EMERGENCY MEDICINE
Payer: COMMERCIAL

## 2024-11-02 DIAGNOSIS — Z23 NEED FOR VACCINATION: Primary | ICD-10-CM

## 2024-11-02 PROCEDURE — 90656 IIV3 VACC NO PRSV 0.5 ML IM: CPT

## 2024-11-02 PROCEDURE — 90480 ADMN SARSCOV2 VAC 1/ONLY CMP: CPT

## 2024-11-02 PROCEDURE — 90471 IMMUNIZATION ADMIN: CPT

## 2024-11-22 ENCOUNTER — TELEPHONE (OUTPATIENT)
Dept: FAMILY MEDICINE CLINIC | Facility: CLINIC | Age: 34
End: 2024-11-22

## 2024-11-22 ENCOUNTER — HOSPITAL ENCOUNTER (OUTPATIENT)
Age: 34
Discharge: HOME | End: 2024-11-22
Attending: EMERGENCY MEDICINE
Payer: COMMERCIAL

## 2024-11-22 VITALS
SYSTOLIC BLOOD PRESSURE: 172 MMHG | HEART RATE: 97 BPM | TEMPERATURE: 98 F | OXYGEN SATURATION: 97 % | RESPIRATION RATE: 16 BRPM | DIASTOLIC BLOOD PRESSURE: 98 MMHG

## 2024-11-22 DIAGNOSIS — J02.9 VIRAL PHARYNGITIS: Primary | ICD-10-CM

## 2024-11-22 DIAGNOSIS — E11.65 TYPE 2 DIABETES MELLITUS WITH HYPERGLYCEMIA, WITHOUT LONG-TERM CURRENT USE OF INSULIN (HCC): Primary | ICD-10-CM

## 2024-11-22 LAB
S PYO AG THROAT QL IA.RAPID: NEGATIVE
SARS-COV-2 RNA RESP QL NAA+PROBE: NOT DETECTED

## 2024-11-22 PROCEDURE — 99213 OFFICE O/P EST LOW 20 MIN: CPT

## 2024-11-22 PROCEDURE — 87651 STREP A DNA AMP PROBE: CPT | Performed by: EMERGENCY MEDICINE

## 2024-11-22 NOTE — ED PROVIDER NOTES
Patient Seen in: [facility]      History     Chief Complaint   Patient presents with    Sore Throat    Ear Problem Pain     Stated Complaint: sore throat    Subjective:   HPI    34-year-old male with a history of hypertension diabetes morbid obesity presents to the immediate care for complaints of sore throat.  Patient complains of sore throat and bilateral ear pressure and congestion since last night.  He denies fevers or chills or myalgias.  Denies productive cough or sputum.  Denies any shortness of breath.  He denies any other exacerbating alleviating factors.    Objective:     Past Medical History:    Diabetes (HCC)    Essential hypertension    Kidney stone    Obesity    8/20/2021    Sleep apnea    From sleep study early January 2023              Past Surgical History:   Procedure Laterality Date    Cholecystectomy      Removal of kidney stone                  Social History     Socioeconomic History    Marital status:    Tobacco Use    Smoking status: Never    Smokeless tobacco: Never   Vaping Use    Vaping status: Never Used   Substance and Sexual Activity    Alcohol use: Yes     Alcohol/week: 2.0 standard drinks of alcohol     Types: 2 Standard drinks or equivalent per week     Comment: 2 a week    Drug use: Never   Other Topics Concern    Caffeine Concern Yes     Comment: 3-4 cups daily    Exercise No              Review of Systems    Positive for stated complaint: sore throat  Other systems are as noted in HPI.  Constitutional and vital signs reviewed.      All other systems reviewed and negative except as noted above.    Physical Exam     ED Triage Vitals [11/22/24 0812]   BP (!) 172/98   Pulse 97   Resp 16   Temp 97.6 °F (36.4 °C)   Temp src Temporal   SpO2 97 %   O2 Device None (Room air)       Current Vitals:   Vital Signs  BP: (!) 172/98 (manual)  Pulse: 97  Resp: 16  Temp: 97.6 °F (36.4 °C)  Temp src: Temporal    Oxygen Therapy  SpO2: 97 %  O2 Device: None (Room  air)        Physical Exam  General: Alert and oriented. No acute distress.  HEENT: Normocephalic. No evidence of trauma. Extraocular movements are intact.  Cardiovascular exam: Regular rate and rhythm  Lungs: Clear to auscultation bilaterally.  Abdomen: Soft, nondistended, nontender.  Extremities: No evidence of deformity. No clubbing or cyanosis.  Neuro: No focal deficit is noted.    ED Course     Labs Reviewed   RAPID SARS-COV-2 BY PCR - Normal   RAPID STREP A   Differential diagnosis includes viral pharyngitis versus strep pharyngitis versus COVID versus viral upper respiratory infection  Patient is noted to be hypertensive.  He states he did not take his blood pressure medications last evening like he is supposed to.                  MDM   Patient was screened and evaluated during this visit.   As a treating physician attending to the patient, I determined, within reasonable clinical confidence and prior to discharge, that an emergency medical condition was not or was no longer present.  There was no indication for further evaluation, treatment or admission on an emergency basis.  Comprehensive verbal and written discharge and follow-up instructions were provided to help prevent relapse or worsening.  Patient was instructed to follow-up with her primary care provider for further evaluation and treatment, but to return immediately to the ER for worsening, concerning, new, changing or persisting symptoms.  I discussed the case with the patient and they had no questions, complaints, or concerns.  Patient felt comfortable going home.    ^^Please note that this report has been produced using speech recognition software and may contain errors related to that system including, but not limited to, errors in grammar, punctuation, and spelling, as well as words and phrases that possibly may have been recognized inappropriately.  If there are any questions or concerns, contact the dictating provider for  clarification      Medical Decision Making      Disposition and Plan     Clinical Impression:  1. Viral pharyngitis         Disposition:  Discharge  11/22/2024  9:14 am    Follow-up:  Yuan Bain MD  [id number] [address] [id number] [phone]    Call   As needed, If symptoms worsen          Medications Prescribed:  Current Discharge Medication List              Supplementary Documentation:

## 2024-11-22 NOTE — ED INITIAL ASSESSMENT (HPI)
Patient has had a sore throat with bilateral ear pressure and mild congestion. Started last night and worse this morning. No fevers.

## 2024-11-22 NOTE — DISCHARGE INSTRUCTIONS
Follow up with your primary care doctor  Continue your home medications including your blood pressure pills  Take tylenol or motrin for fever  Push fluids  Take an over the counter decongestant as needed

## 2024-12-20 RX ORDER — HYDROCHLOROTHIAZIDE 25 MG/1
25 TABLET ORAL DAILY
Qty: 90 TABLET | Refills: 0 | Status: SHIPPED | OUTPATIENT
Start: 2024-12-20

## 2025-01-20 RX ORDER — LOSARTAN POTASSIUM 100 MG/1
100 TABLET ORAL DAILY
Qty: 90 TABLET | Refills: 1 | Status: SHIPPED | OUTPATIENT
Start: 2025-01-20

## 2025-01-22 ENCOUNTER — LAB ENCOUNTER (OUTPATIENT)
Dept: LAB | Age: 35
End: 2025-01-22
Attending: FAMILY MEDICINE
Payer: COMMERCIAL

## 2025-01-22 ENCOUNTER — OFFICE VISIT (OUTPATIENT)
Dept: FAMILY MEDICINE CLINIC | Facility: CLINIC | Age: 35
End: 2025-01-22
Payer: COMMERCIAL

## 2025-01-22 VITALS
HEIGHT: 66 IN | SYSTOLIC BLOOD PRESSURE: 148 MMHG | BODY MASS INDEX: 49.82 KG/M2 | TEMPERATURE: 98 F | WEIGHT: 310 LBS | DIASTOLIC BLOOD PRESSURE: 94 MMHG | OXYGEN SATURATION: 98 % | RESPIRATION RATE: 16 BRPM | HEART RATE: 92 BPM

## 2025-01-22 DIAGNOSIS — E11.65 TYPE 2 DIABETES MELLITUS WITH HYPERGLYCEMIA, WITHOUT LONG-TERM CURRENT USE OF INSULIN (HCC): ICD-10-CM

## 2025-01-22 DIAGNOSIS — Z00.00 WELLNESS EXAMINATION: Primary | ICD-10-CM

## 2025-01-22 DIAGNOSIS — Z00.00 LABORATORY EXAMINATION ORDERED AS PART OF A ROUTINE GENERAL MEDICAL EXAMINATION: ICD-10-CM

## 2025-01-22 LAB
ALBUMIN SERPL-MCNC: 4.5 G/DL (ref 3.2–4.8)
ALBUMIN/GLOB SERPL: 1.3 {RATIO} (ref 1–2)
ALP LIVER SERPL-CCNC: 42 U/L
ALT SERPL-CCNC: 25 U/L
ANION GAP SERPL CALC-SCNC: 6 MMOL/L (ref 0–18)
AST SERPL-CCNC: 21 U/L (ref ?–34)
BASOPHILS # BLD AUTO: 0.02 X10(3) UL (ref 0–0.2)
BASOPHILS NFR BLD AUTO: 0.3 %
BILIRUB SERPL-MCNC: 0.7 MG/DL (ref 0.3–1.2)
BUN BLD-MCNC: 14 MG/DL (ref 9–23)
CALCIUM BLD-MCNC: 9.6 MG/DL (ref 8.7–10.6)
CHLORIDE SERPL-SCNC: 103 MMOL/L (ref 98–112)
CHOLEST SERPL-MCNC: 158 MG/DL (ref ?–200)
CO2 SERPL-SCNC: 31 MMOL/L (ref 21–32)
CREAT BLD-MCNC: 0.74 MG/DL
CREAT UR-SCNC: 115.2 MG/DL
EGFRCR SERPLBLD CKD-EPI 2021: 120 ML/MIN/1.73M2 (ref 60–?)
EOSINOPHIL # BLD AUTO: 0.1 X10(3) UL (ref 0–0.7)
EOSINOPHIL NFR BLD AUTO: 1.4 %
ERYTHROCYTE [DISTWIDTH] IN BLOOD BY AUTOMATED COUNT: 13.2 %
EST. AVERAGE GLUCOSE BLD GHB EST-MCNC: 111 MG/DL (ref 68–126)
FASTING PATIENT LIPID ANSWER: YES
FASTING STATUS PATIENT QL REPORTED: YES
GLOBULIN PLAS-MCNC: 3.4 G/DL (ref 2–3.5)
GLUCOSE BLD-MCNC: 105 MG/DL (ref 70–99)
HBA1C MFR BLD: 5.5 % (ref ?–5.7)
HCT VFR BLD AUTO: 43.3 %
HDLC SERPL-MCNC: 36 MG/DL (ref 40–59)
HGB BLD-MCNC: 14.9 G/DL
IMM GRANULOCYTES # BLD AUTO: 0.03 X10(3) UL (ref 0–1)
IMM GRANULOCYTES NFR BLD: 0.4 %
LDLC SERPL CALC-MCNC: 100 MG/DL (ref ?–100)
LYMPHOCYTES # BLD AUTO: 2.55 X10(3) UL (ref 1–4)
LYMPHOCYTES NFR BLD AUTO: 36.2 %
MCH RBC QN AUTO: 30.5 PG (ref 26–34)
MCHC RBC AUTO-ENTMCNC: 34.4 G/DL (ref 31–37)
MCV RBC AUTO: 88.5 FL
MICROALBUMIN UR-MCNC: 2.1 MG/DL
MICROALBUMIN/CREAT 24H UR-RTO: 18.2 UG/MG (ref ?–30)
MONOCYTES # BLD AUTO: 0.52 X10(3) UL (ref 0.1–1)
MONOCYTES NFR BLD AUTO: 7.4 %
NEUTROPHILS # BLD AUTO: 3.82 X10 (3) UL (ref 1.5–7.7)
NEUTROPHILS # BLD AUTO: 3.82 X10(3) UL (ref 1.5–7.7)
NEUTROPHILS NFR BLD AUTO: 54.3 %
NONHDLC SERPL-MCNC: 122 MG/DL (ref ?–130)
OSMOLALITY SERPL CALC.SUM OF ELEC: 291 MOSM/KG (ref 275–295)
PLATELET # BLD AUTO: 285 10(3)UL (ref 150–450)
POTASSIUM SERPL-SCNC: 4 MMOL/L (ref 3.5–5.1)
PROT SERPL-MCNC: 7.9 G/DL (ref 5.7–8.2)
RBC # BLD AUTO: 4.89 X10(6)UL
SODIUM SERPL-SCNC: 140 MMOL/L (ref 136–145)
T4 FREE SERPL-MCNC: 1.2 NG/DL (ref 0.8–1.7)
TRIGL SERPL-MCNC: 123 MG/DL (ref 30–149)
TSI SER-ACNC: 1.97 UIU/ML (ref 0.55–4.78)
VLDLC SERPL CALC-MCNC: 20 MG/DL (ref 0–30)
WBC # BLD AUTO: 7 X10(3) UL (ref 4–11)

## 2025-01-22 PROCEDURE — 84439 ASSAY OF FREE THYROXINE: CPT

## 2025-01-22 PROCEDURE — 3080F DIAST BP >= 90 MM HG: CPT | Performed by: FAMILY MEDICINE

## 2025-01-22 PROCEDURE — 84443 ASSAY THYROID STIM HORMONE: CPT

## 2025-01-22 PROCEDURE — 80053 COMPREHEN METABOLIC PANEL: CPT

## 2025-01-22 PROCEDURE — 83036 HEMOGLOBIN GLYCOSYLATED A1C: CPT

## 2025-01-22 PROCEDURE — 80061 LIPID PANEL: CPT

## 2025-01-22 PROCEDURE — 36415 COLL VENOUS BLD VENIPUNCTURE: CPT

## 2025-01-22 PROCEDURE — 82043 UR ALBUMIN QUANTITATIVE: CPT

## 2025-01-22 PROCEDURE — 99395 PREV VISIT EST AGE 18-39: CPT | Performed by: FAMILY MEDICINE

## 2025-01-22 PROCEDURE — 3077F SYST BP >= 140 MM HG: CPT | Performed by: FAMILY MEDICINE

## 2025-01-22 PROCEDURE — 85025 COMPLETE CBC W/AUTO DIFF WBC: CPT

## 2025-01-22 PROCEDURE — 3008F BODY MASS INDEX DOCD: CPT | Performed by: FAMILY MEDICINE

## 2025-01-22 PROCEDURE — 82570 ASSAY OF URINE CREATININE: CPT

## 2025-01-22 NOTE — PROGRESS NOTES
Wellness Exam    REASON FOR VISIT:    Mil Haley is a 34 year old male who presents for an Annual Health Assessment.    Current Complaints: Mr. Haley is here for his wellness exam  Flu Shot: see immunization record  Health Maintenance Topics with due status: Overdue       Topic Date Due    Diabetes Care Dilated Eye Exam Never done    Annual Physical Never done    Pneumococcal Vaccine: Birth to 50yrs Never done    Diabetes Care A1C 05/20/2024    Diabetes Care: GFR 11/20/2024    Annual Depression Screening 01/01/2025    Diabetes Care: Foot Exam (Annual) 01/01/2025    Diabetes Care: Microalb/Creat Ratio (Annual) 01/01/2025     Reported Health:   He is a very pleasant 34-year-old gentleman with history of diabetes mellitus, hypertension, obesity presenting today for his wellness exam.  He has been taking his meds compliantly.  His blood pressure is elevated here but has had some stress recently.  They will come their first child about 5 months ago but had some complications but that had resolved ready.  He is back into his routine early.  He acknowledged that he had gained some weight.  Previous A1c was about 5%.  He did his labs and are still pending results.    I had reviewed past medical and family histories together with allergy and medication lists documented.    Details about the complaints:  N/A    General Health     How would you describe your current health state?: Good    Type of Diet: Balanced    How do you maintain positive mental well-being?: Social Interaction;Visiting Friends;Visiting Family    How would you describe your daily physical activity?: Moderate    If you are a male age 45-79 or a female age 55-79, do you take aspirin?: No    Have you had any immunizations at another office such as Influenza, Hepatitis B, Tetanus, or Pneumococcal?: No    At any time do you feel concerned for the safety/well-being of yourself and/or your children, in your home or elsewhere?: No     CAGE:     Cut: Have you  ever felt you should Cut down on your drinking?: No    Annoyed: Have people Annoyed you by criticizing your drinking?: No    Guilty: Have you ever felt bad or Guilty about your drinking?: No    Eye Opener: Have you ever had a drink first thing in the morning to steady your nerves or to get rid of a hangover (Eye opener)?: No    Scoring  Total Score: 0     PHQ-4: Over the LAST 2 WEEKS       Depression Screening (PHQ-2/PHQ-9): Over the LAST 2 WEEKS   Little interest or pleasure in doing things (over the last two weeks)?: Not at all    Feeling down, depressed, or hopeless (over the last two weeks)?: Not at all    PHQ-2 SCORE: 0              PREVENTATIVE SERVICES  INDICATIONS AND SCHEDULE Recommendation Internal Lab or Procedure External Lab or Procedure   Colonoscopy Screen Every 10 years No recommendations at this time    Flex Sigmoidoscopy Screen  Every 5 years No results found for this or any previous visit.    Fecal Occult Blood  Annually No results found for: \"FOB\", \"OCCULTSTOOL\"    Obesity Screening Screen all adults annually Body mass index is 50.04 kg/m².      Preventive Services for Which Recommendations Vary with Risk Recommendation Internal Lab or Procedure External Lab or Procedure   Cholesterol Screening Recommended screening varies with age, risk and gender LDL Cholesterol (mg/dL)   Date Value   11/20/2023 98       Diabetes Screening  If history of high blood pressure or other  risk factors HgbA1C (%)   Date Value   11/20/2023 5.1     Glucose (mg/dL)   Date Value   11/20/2023 86         Gonorrhea Screening If high risk No results found for: \"GONOCOCCUS\"    HIV Screening For all adults age 18-65, older adults at increased risk No results found for: \"HIV\"    Syphilis Screening Screen if pregnant or high risk No results found for: \"RPR\"    Hepatitis C Screening Screen those at high risk plus screen one time for adults born 1947-1 965 No results found for: \"HCVAB\"    Tuberculosis Screen If high risk No  components found for: \"PPDINDURAT\"      ALLERGIES:   Allergies[1]    CURRENT MEDICATIONS:   Current Outpatient Medications   Medication Sig Dispense Refill    LOSARTAN 100 MG Oral Tab TAKE 1 TABLET(100 MG) BY MOUTH DAILY 90 tablet 1    HYDROCHLOROTHIAZIDE 25 MG Oral Tab TAKE 1 TABLET(25 MG) BY MOUTH DAILY 90 tablet 0    AMLODIPINE 10 MG Oral Tab TAKE 1 TABLET(10 MG) BY MOUTH DAILY 90 tablet 1      MEDICAL INFORMATION:   Past Medical History:    Diabetes (HCC)    Essential hypertension    Kidney stone    Obesity    8/20/2021    Sleep apnea    From sleep study early January 2023      Past Surgical History:   Procedure Laterality Date    Cholecystectomy      Removal of kidney stone        Family History   Problem Relation Age of Onset    Hypertension Father         Suffers from high blood pressure    No Known Problems Mother       SOCIAL HISTORY:   Social History     Socioeconomic History    Marital status:    Tobacco Use    Smoking status: Never    Smokeless tobacco: Never   Vaping Use    Vaping status: Never Used   Substance and Sexual Activity    Alcohol use: Yes     Alcohol/week: 2.0 standard drinks of alcohol     Types: 2 Standard drinks or equivalent per week     Comment: 2 a week    Drug use: Never   Other Topics Concern    Caffeine Concern Yes     Comment: 3-4 cups daily    Exercise No          REVIEW OF SYSTEMS:   Constitutional: Negative for fever, chills and fatigue.   HENT: Negative for hearing loss, congestion, sore throat and neck pain.    Eyes: Negative for pain and visual disturbance.   Respiratory: Negative for cough and shortness of breath.    Cardiovascular: Negative for chest pain and palpitations.   Gastrointestinal: Negative for nausea, vomiting, abdominal pain and diarrhea.   Genitourinary: Negative for urgency, frequency and difficulty urinating.   Musculoskeletal: Negative for arthralgias and no gait problem.   Skin: Negative for color change and rash.   Neurological: Negative for  tremors, weakness and numbness.   Hematological: Negative for adenopathy. Does not bruise/bleed easily.   Psychiatric/Behavioral: Negative for confusion and agitation. The patient is not nervous/anxious.      EXAM:   BP (!) 148/94   Pulse 92   Temp 98 °F (36.7 °C) (Temporal)   Resp 16   Ht 5' 6\" (1.676 m)   Wt (!) 310 lb (140.6 kg)   SpO2 98%   BMI 50.04 kg/m²   Constitutional: He appears well-developed, nourished, and his stated age. Vital signs reviewed  Pleasant man   Head: Normocephalic and atraumatic.   Ear: TMs visible and normal bilaterally  Nose: Nose normal.   Eyes: EOM are normal. Pupils are equal, round, and reactive to light. No scleral icterus.   Neck: Normal range of motion. No thyromegaly present.   Cardiovascular: Normal rate, regular rhythm and normal heart sounds.  Exam reveals no friction rub.    No murmur heard.  Pulmonary/Chest: Effort normal and breath sounds normal. He has no wheezes. He has no rales.   Abdominal: Soft. Bowel sounds are normal. There is no tenderness.   Musculoskeletal: Normal range of motion. He exhibits no edema.   Lymphadenopathy:     He has no cervical adenopathy.   Neurological: He is alert and oriented to person, place, and time. He has normal reflexes.   Skin: Skin is warm. No rash noted. No erythema. with normal hair  Psychiatric: He has a normal mood and affect. His behavior is normal.     ASSESSMENT AND OTHER RELEVANT CHRONIC CONDITIONS:   Mil Haley is a 34 year old male who presents for an Annual Health Assessment.   1. Wellness examination        PLAN SUMMARY:   Mil Haley is a 34 year old male  Age appropriate cancer screening, labs, safety, immunizations were discussed with the patient and ordered as follows:  Diagnoses and all orders for this visit:    Wellness examination    Will await labs and will review and will notify him with recommendations.  Agree with efforts to lose weight which she had lost some already.  Continue with blood pressure  medications but monitor blood pressure  Reduce salt and carbs in his diet.  Keep active.  Follow-up in 1 year as needed      This note was prepared using Dragon [employer] voice recognition dictation software. As a result errors may occur. When identified these errors have been corrected. While every attempt is made to correct errors during dictation discrepancies may still exist            No orders of the defined types were placed in this encounter.      Imaging & Consults:  None    His 5 year prevention plan includes: annual visits for fasting labs  Health Maintenance   Topic Date Due    Diabetes Care Dilated Eye Exam  Never done    Annual Physical  Never done    Pneumococcal Vaccine: Birth to 50yrs (1 of 2 - PCV) Never done    Diabetes Care A1C  05/20/2024    Diabetes Care: GFR  11/20/2024    Annual Depression Screening  01/01/2025    Diabetes Care: Foot Exam (Annual)  01/01/2025    Diabetes Care: Microalb/Creat Ratio (Annual)  01/01/2025    DTaP,Tdap,and Td Vaccines (3 - Td or Tdap) 08/24/2027    Influenza Vaccine  Completed    COVID-19 Vaccine  Completed    Meningococcal B Vaccine  Aged Out     Patient/Caregiver Education:  Patient/Caregiver Education: There are no barriers to learning. Medical education done.  Outcome: Patient verbalizes understanding.    Educated by: MD   The patient indicates understanding of these issues and agrees to the plan.    SUGGESTED VACCINATIONS - Influenza, Pneumococcal, Zoster, Tetanus     Immunization History   Administered Date(s) Administered    Covid-19 Vaccine Moderna 100 mcg/0.5 ml 04/11/2021, 05/10/2021    Covid-19 Vaccine Moderna Bivalent 50mcg/0.5mL 12+ years 12/09/2022    Covid-19 Vaccine Pfizer 30 mcg/0.3 ml 12/14/2021    FLULAVAL 6 months & older 0.5 ml Prefilled syringe (54979) 11/08/2022    FLUZONE 6 months and older PFS 0.5 ml (60109) 12/14/2021    Flucelvax Influenza vaccine, trivalent (ccIIV3), 0.5mL IM 11/20/2023    HEP B, Ped/Adol 09/24/1998    Influenza  11/20/2023    Influenza Vaccine, trivalent (IIV3), PF 0.5mL ([id number]) 11/02/2024    Moderna Covid-19 Vaccine 50mcg/0.5ml 12yrs+ 11/20/2023    Pfizer Covid-19 Vaccine 30mcg/0.3ml 12yrs+ 11/02/2024    TDAP 01/01/2017, 08/24/2017       Influenza Annually   Pneumococcal if high risk   Td/Tdap once then every 10 years   HPV Males 11-21   Zoster (Shingles) 60 and older: one dose   Varicella 2 doses if not immune   MMR 1-2 doses if born after 1958 and not immune     Patient Active Problem List   Diagnosis    Mixed hyperlipidemia    Type 2 diabetes mellitus with hyperglycemia, without long-term current use of insulin (HCC)    Hypertension    Morbid obesity due to excess calories (HCC)    JESSIE (obstructive sleep apnea)     PREVENTIVE VISIT,EST,40-64         [1] No Known Allergies

## 2025-01-22 NOTE — PATIENT INSTRUCTIONS
Thank you for choosing Yuan Bain MD at [facility]  To Do: Mil Haley  1. Please see age appropriate health prevention below     Call [phone] to schedule the appointment.   Please signup for [employer], which is electronic access to your record if you have not done so.  All your results will post on there.  [email or web address]   You can NOW use LegalReach to book your appointments with us, or consider using open access scheduling which is through the [place] website [email or web address] and type in Yuan Bain MD and follow the links for \"Schedule Online Now\"    To schedule Imaging or tests at [place] call Central Scheduling [phone], Go to [facility] ER Building (For example: CT scans, X rays, Ultrasound, MRI)  Cardiac Testing in ER building Building A second floor Cardiac Testing [phone] (For example: Holter Monitor, Cardiac Stress tests,Event Monitor, or 2D Echocardiograms)  [facility] call [phone] usually in [facility]  Walk in Clinic in [place] at [address] Mon-Fri at 8am-7:30 p.m., and Sat/Sun 9:00a.m.-4:30 p.m.  Also at [address] [place] [address]  Call [phone] for info     Please call our office about any questions regarding your treatment/medicines/tests as a result of today's visit.  For your safety, read the entire package insert of all medicines prescribed to you and be aware of all of the risks of treatment even beyond those discussed today.  All therapies have potential risk of harm or side effects or medication interactions.  It is your duty and for your safety to discuss with the pharmacist and our office with questions, and to notify us and stop treatment if problems arise, but know that our intention is that the benefits outweigh those potential risks and we strive to make you healthier and to improve your quality of life.    Referrals, and Radiology Information:    If your insurance requires a referral to a specialist, please  allow 5 business days to process your referral request.    If Yuan Bain MD orders a CT or MRI, it may take up to 10 business days to receive approval from your insurance company. Once our office has called informing you that the insurance company approved your testing, please call Central Scheduling at [phone]  Please allow our office 5 business days to contact you regarding any testing results.    Refill policies:   Allow 3 business days for refills; controlled substances may take longer and must be picked up from the office in person.  Narcotic medications can only be filled in 30 day increments and must be refilled at an office visit only.  If your prescription is due for a refill, you may be due for a follow-up appointment.  We cannot refill your maintenance medications at a preventative wellness visit.  To best provide you care, patients receiving maintenance medications need to be seen at least twice a year.

## 2025-03-17 RX ORDER — HYDROCHLOROTHIAZIDE 25 MG/1
25 TABLET ORAL DAILY
Qty: 90 TABLET | Refills: 0 | Status: SHIPPED | OUTPATIENT
Start: 2025-03-17

## 2025-04-07 ENCOUNTER — MED REC SCAN ONLY (OUTPATIENT)
Facility: CLINIC | Age: 35
End: 2025-04-07

## 2025-04-23 RX ORDER — AMLODIPINE BESYLATE 10 MG/1
10 TABLET ORAL DAILY
Qty: 90 TABLET | Refills: 1 | Status: SHIPPED | OUTPATIENT
Start: 2025-04-23

## 2025-06-16 ENCOUNTER — HOSPITAL ENCOUNTER (OUTPATIENT)
Age: 35
Discharge: HOME | End: 2025-06-16
Payer: COMMERCIAL

## 2025-06-16 VITALS
HEIGHT: 66 IN | BODY MASS INDEX: 32.14 KG/M2 | SYSTOLIC BLOOD PRESSURE: 142 MMHG | RESPIRATION RATE: 20 BRPM | TEMPERATURE: 99 F | DIASTOLIC BLOOD PRESSURE: 89 MMHG | OXYGEN SATURATION: 98 % | WEIGHT: 200 LBS | HEART RATE: 97 BPM

## 2025-06-16 DIAGNOSIS — H66.002 NON-RECURRENT ACUTE SUPPURATIVE OTITIS MEDIA OF LEFT EAR WITHOUT SPONTANEOUS RUPTURE OF TYMPANIC MEMBRANE: Primary | ICD-10-CM

## 2025-06-16 LAB — S PYO AG THROAT QL IA.RAPID: NEGATIVE

## 2025-06-16 PROCEDURE — 99213 OFFICE O/P EST LOW 20 MIN: CPT

## 2025-06-16 PROCEDURE — 87651 STREP A DNA AMP PROBE: CPT | Performed by: PHYSICIAN ASSISTANT

## 2025-06-16 RX ORDER — DEXAMETHASONE 4 MG/1
8 TABLET ORAL ONCE
Status: COMPLETED | OUTPATIENT
Start: 2025-06-16 | End: 2025-06-16

## 2025-06-16 RX ORDER — FLUTICASONE PROPIONATE 50 MCG
2 SPRAY, SUSPENSION (ML) NASAL DAILY
Qty: 16 G | Refills: 0 | Status: SHIPPED | OUTPATIENT
Start: 2025-06-16 | End: 2025-07-16

## 2025-06-16 NOTE — ED PROVIDER NOTES
Patient Seen in: [facility] [place] [facility]        History  No chief complaint on file.    Stated Complaint: Cold Symptoms    Subjective:   The history is provided by the patient.               34-year-old male with past med history of hypertension, diabetes presents to immediate care due to nasal congestion for the past week.  Associated sore throat without fevers trismus drooling or muffled voice.  Began to have some bilateral ear pain intermittently over the last few days and now more constant in the left ear starting 3 days ago.  No drainage or muffled hearing.  Patient been taking Tylenol and Coricidin over-the-counter without relief.      Objective:     Past Medical History:    Diabetes (HCC)    Essential hypertension    Kidney stone    Obesity    8/20/2021    Sleep apnea    From sleep study early January 2023              Past Surgical History:   Procedure Laterality Date    Cholecystectomy      Removal of kidney stone                  Social History     Socioeconomic History    Marital status:    Tobacco Use    Smoking status: Never    Smokeless tobacco: Never   Vaping Use    Vaping status: Never Used   Substance and Sexual Activity    Alcohol use: Yes     Alcohol/week: 2.0 standard drinks of alcohol     Types: 2 Standard drinks or equivalent per week     Comment: 2 a week    Drug use: Never   Other Topics Concern    Caffeine Concern Yes     Comment: 3-4 cups daily    Exercise No              Review of Systems   Constitutional: Negative.    HENT:  Positive for congestion, ear pain and sore throat. Negative for ear discharge, trouble swallowing and voice change.    Respiratory: Negative.     Cardiovascular: Negative.    Gastrointestinal: Negative.        Positive for stated complaint: Cold Symptoms  Other systems are as noted in HPI.  Constitutional and vital signs reviewed.      All other systems reviewed and negative except as noted above.                  Physical Exam    ED Triage Vitals  [06/16/25 0806]   BP (!) 166/103   Pulse 97   Resp 20   Temp 98.6 °F (37 °C)   Temp src Oral   SpO2 98 %   O2 Device None (Room air)       Current Vitals:   Vital Signs  BP: 142/89  Pulse: 97  Resp: 20  Temp: 98.6 °F (37 °C)  Temp src: Oral    Oxygen Therapy  SpO2: 98 %  O2 Device: None (Room air)            Physical Exam  Vitals and nursing note reviewed.   Constitutional:       General: He is not in acute distress.     Appearance: Normal appearance.   HENT:      Head: Normocephalic.      Right Ear: Ear canal and external ear normal.      Left Ear: Ear canal and external ear normal.      Ears:      Comments: Bilateral serous effusions in both ears.  Left TM is erythematous     Mouth/Throat:      Mouth: Mucous membranes are moist.      Comments: 2+ tonsillar edema with exudate.  Eyes:      Conjunctiva/sclera: Conjunctivae normal.   Cardiovascular:      Rate and Rhythm: Normal rate and regular rhythm.   Pulmonary:      Effort: Pulmonary effort is normal.   Musculoskeletal:         General: Normal range of motion.      Cervical back: Normal range of motion.   Lymphadenopathy:      Cervical: Cervical adenopathy present.   Skin:     General: Skin is warm.   Neurological:      General: No focal deficit present.      Mental Status: He is alert and oriented to person, place, and time.   Psychiatric:         Mood and Affect: Mood normal.         Behavior: Behavior normal.                 ED Course  Labs Reviewed   RAPID STREP A - Normal                            MDM  Ddx -viral URI with cough, sinusitis, strep, viral pharyngitis, otitis externa, otitis media    On exam the patient is afebrile nontoxic.  Patient is noted to be hypertensive 166/103 admits he did not take his blood pressure medication and has not been sleeping.  Denies any severe headache blurry vision chest pain or shortness of breath.  Is compliant with his blood pressure medications.  Advised to take his blood pressure medication continue to keep a log.   Bilateral TMs with effusions left TM is erythematous.  Posterior pharynx with 2+ tonsillar edema exudate.  No concern for peritonsillar abscess.  Heart regular rate and rhythm.  Lungs clear to auscultation. Strep is negative.  Due to the tonsillar edema and effusion he was given a dose of Decadron.  Will also start on Augmentin and Flonase.  Will continue to avoid decongestants due to history of hypertension. Strict return precautions were discussed all questions were answered patient comfortable with Discharge home        Medical Decision Making  Problems Addressed:  Non-recurrent acute suppurative otitis media of left ear without spontaneous rupture of tympanic membrane: acute illness or injury    Amount and/or Complexity of Data Reviewed  Labs: ordered. Decision-making details documented in ED Course.    Risk  OTC drugs.  Prescription drug management.        Disposition and Plan     Clinical Impression:  1. Non-recurrent acute suppurative otitis media of left ear without spontaneous rupture of tympanic membrane         Disposition:  Discharge  6/16/2025  8:22 am    Follow-up:  Yuan Bain MD  [address] [address] [address]  [phone]                Medications Prescribed:  Discharge Medication List as of 6/16/2025  8:24 AM        START taking these medications    Details   amoxicillin clavulanate 875-125 MG Oral Tab Take 1 tablet by mouth 2 (two) times daily for 10 days., Normal, Disp-20 tablet, R-0      fluticasone propionate 50 MCG/ACT Nasal Suspension 2 sprays by Nasal route daily., Normal, Disp-16 g, R-0                   Supplementary Documentation:

## 2025-06-16 NOTE — ED INITIAL ASSESSMENT (HPI)
Pt states he's been having cold like symptoms started with sore throat, that started yesterday this morning he is having ear pain. Denies fevers.

## 2025-06-16 NOTE — DISCHARGE INSTRUCTIONS
Continue to monitor your blood pressure close follow-up if remains elevated    Continue to increase fluids and rest  Ibuprofen and/or tylenol as needed for pain  Take augmentin twice a day until gone  Decadron will stay in your system for the next 3 days  Take flonase as we discussed for congestion  Follow up with primary care doctor  Return to the ER if symptoms worsen

## 2025-07-01 ENCOUNTER — PATIENT MESSAGE (OUTPATIENT)
Dept: FAMILY MEDICINE CLINIC | Facility: CLINIC | Age: 35
End: 2025-07-01

## 2025-07-22 ENCOUNTER — MED REC SCAN ONLY (OUTPATIENT)
Facility: CLINIC | Age: 35
End: 2025-07-22

## 2025-07-31 RX ORDER — LOSARTAN POTASSIUM 100 MG/1
100 TABLET ORAL DAILY
Qty: 90 TABLET | Refills: 0 | Status: SHIPPED | OUTPATIENT
Start: 2025-07-31